# Patient Record
Sex: FEMALE | Race: WHITE | HISPANIC OR LATINO | Employment: OTHER | URBAN - METROPOLITAN AREA
[De-identification: names, ages, dates, MRNs, and addresses within clinical notes are randomized per-mention and may not be internally consistent; named-entity substitution may affect disease eponyms.]

---

## 2017-03-08 ENCOUNTER — HOSPITAL ENCOUNTER (OUTPATIENT)
Dept: LAB | Facility: MEDICAL CENTER | Age: 82
End: 2017-03-08
Payer: MEDICARE

## 2017-03-08 LAB
ALBUMIN SERPL BCP-MCNC: 4.2 G/DL (ref 3.2–4.9)
ALBUMIN/GLOB SERPL: 1.3 G/DL
ALP SERPL-CCNC: 49 U/L (ref 30–99)
ALT SERPL-CCNC: 18 U/L (ref 2–50)
ANION GAP SERPL CALC-SCNC: 9 MMOL/L (ref 0–11.9)
AST SERPL-CCNC: 21 U/L (ref 12–45)
BILIRUB SERPL-MCNC: 1.1 MG/DL (ref 0.1–1.5)
BUN SERPL-MCNC: 21 MG/DL (ref 8–22)
CALCIUM SERPL-MCNC: 9.6 MG/DL (ref 8.5–10.5)
CHLORIDE SERPL-SCNC: 107 MMOL/L (ref 96–112)
CO2 SERPL-SCNC: 22 MMOL/L (ref 20–33)
CREAT SERPL-MCNC: 0.96 MG/DL (ref 0.5–1.4)
GLOBULIN SER CALC-MCNC: 3.3 G/DL (ref 1.9–3.5)
GLUCOSE SERPL-MCNC: 90 MG/DL (ref 65–99)
POTASSIUM SERPL-SCNC: 3.8 MMOL/L (ref 3.6–5.5)
PROT SERPL-MCNC: 7.5 G/DL (ref 6–8.2)
SODIUM SERPL-SCNC: 138 MMOL/L (ref 135–145)
TSH SERPL DL<=0.005 MIU/L-ACNC: 1.62 UIU/ML (ref 0.3–3.7)

## 2017-03-08 PROCEDURE — 80053 COMPREHEN METABOLIC PANEL: CPT

## 2017-03-08 PROCEDURE — 84443 ASSAY THYROID STIM HORMONE: CPT

## 2017-03-08 PROCEDURE — 36415 COLL VENOUS BLD VENIPUNCTURE: CPT

## 2017-12-29 ENCOUNTER — HOSPITAL ENCOUNTER (OUTPATIENT)
Dept: RADIOLOGY | Facility: MEDICAL CENTER | Age: 82
End: 2017-12-29

## 2018-01-08 ENCOUNTER — APPOINTMENT (OUTPATIENT)
Dept: RADIOLOGY | Facility: MEDICAL CENTER | Age: 83
End: 2018-01-08
Attending: FAMILY MEDICINE
Payer: COMMERCIAL

## 2018-01-09 ENCOUNTER — HOSPITAL ENCOUNTER (OUTPATIENT)
Dept: RADIOLOGY | Facility: MEDICAL CENTER | Age: 83
End: 2018-01-09
Attending: FAMILY MEDICINE
Payer: MEDICARE

## 2018-01-09 DIAGNOSIS — Z12.31 VISIT FOR SCREENING MAMMOGRAM: ICD-10-CM

## 2018-01-09 PROCEDURE — 77067 SCR MAMMO BI INCL CAD: CPT

## 2018-01-30 ENCOUNTER — HOSPITAL ENCOUNTER (OUTPATIENT)
Dept: LAB | Facility: MEDICAL CENTER | Age: 83
End: 2018-01-30
Attending: INTERNAL MEDICINE
Payer: MEDICARE

## 2018-01-30 LAB
25(OH)D3 SERPL-MCNC: 14 NG/ML (ref 30–100)
ALBUMIN SERPL BCP-MCNC: 4.7 G/DL (ref 3.2–4.9)
ALBUMIN/GLOB SERPL: 1.8 G/DL
ALP SERPL-CCNC: 44 U/L (ref 30–99)
ALT SERPL-CCNC: 16 U/L (ref 2–50)
ANION GAP SERPL CALC-SCNC: 9 MMOL/L (ref 0–11.9)
AST SERPL-CCNC: 19 U/L (ref 12–45)
BILIRUB SERPL-MCNC: 0.7 MG/DL (ref 0.1–1.5)
BUN SERPL-MCNC: 21 MG/DL (ref 8–22)
CALCIUM SERPL-MCNC: 9.5 MG/DL (ref 8.5–10.5)
CHLORIDE SERPL-SCNC: 105 MMOL/L (ref 96–112)
CO2 SERPL-SCNC: 24 MMOL/L (ref 20–33)
CREAT SERPL-MCNC: 0.82 MG/DL (ref 0.5–1.4)
GLOBULIN SER CALC-MCNC: 2.6 G/DL (ref 1.9–3.5)
GLUCOSE SERPL-MCNC: 76 MG/DL (ref 65–99)
POTASSIUM SERPL-SCNC: 4.4 MMOL/L (ref 3.6–5.5)
PROT SERPL-MCNC: 7.3 G/DL (ref 6–8.2)
PTH-INTACT SERPL-MCNC: 36.9 PG/ML (ref 14–72)
SODIUM SERPL-SCNC: 138 MMOL/L (ref 135–145)

## 2018-01-30 PROCEDURE — 80053 COMPREHEN METABOLIC PANEL: CPT

## 2018-01-30 PROCEDURE — 36415 COLL VENOUS BLD VENIPUNCTURE: CPT

## 2018-01-30 PROCEDURE — 82306 VITAMIN D 25 HYDROXY: CPT

## 2018-01-30 PROCEDURE — 83970 ASSAY OF PARATHORMONE: CPT

## 2018-04-17 ENCOUNTER — HOSPITAL ENCOUNTER (OUTPATIENT)
Dept: LAB | Facility: MEDICAL CENTER | Age: 83
End: 2018-04-17
Attending: INTERNAL MEDICINE
Payer: MEDICARE

## 2018-04-17 LAB — 25(OH)D3 SERPL-MCNC: 40 NG/ML (ref 30–100)

## 2018-04-17 PROCEDURE — 82306 VITAMIN D 25 HYDROXY: CPT

## 2018-04-17 PROCEDURE — 36415 COLL VENOUS BLD VENIPUNCTURE: CPT

## 2018-04-25 ENCOUNTER — HOSPITAL ENCOUNTER (OUTPATIENT)
Dept: LAB | Facility: MEDICAL CENTER | Age: 83
End: 2018-04-25
Attending: OPTOMETRIST
Payer: MEDICARE

## 2018-04-25 LAB — RHEUMATOID FACT SER IA-ACNC: <10 IU/ML (ref 0–14)

## 2018-04-25 PROCEDURE — 86431 RHEUMATOID FACTOR QUANT: CPT

## 2018-04-25 PROCEDURE — 83516 IMMUNOASSAY NONANTIBODY: CPT

## 2018-04-25 PROCEDURE — 86038 ANTINUCLEAR ANTIBODIES: CPT

## 2018-04-25 PROCEDURE — 86235 NUCLEAR ANTIGEN ANTIBODY: CPT

## 2018-04-25 PROCEDURE — 36415 COLL VENOUS BLD VENIPUNCTURE: CPT

## 2018-04-27 LAB — NUCLEAR IGG SER QL IA: NORMAL

## 2018-04-28 LAB
CENTROMERE IGG TITR SER IF: 2 AU/ML (ref 0–40)
ENA JO1 AB TITR SER: 2 AU/ML (ref 0–40)
ENA SCL70 IGG SER QL: 1 AU/ML (ref 0–40)
ENA SM IGG SER-ACNC: 0 AU/ML (ref 0–40)
ENA SS-B IGG SER IA-ACNC: 0 AU/ML (ref 0–40)
ENA SS-B IGG SER IA-ACNC: 0 AU/ML (ref 0–40)
RIBOSOMAL P AB SER-ACNC: 6 AU/ML (ref 0–40)
SSA52 R0ENA AB IGG Q0420: 12 AU/ML (ref 0–40)
SSA52 R0ENA AB IGG Q0420: 12 AU/ML (ref 0–40)
SSA60 R0ENA AB IGG Q0419: 2 AU/ML (ref 0–40)
SSA60 R0ENA AB IGG Q0419: 2 AU/ML (ref 0–40)
U1 SNRNP IGG SER QL: 0 AU/ML (ref 0–40)

## 2019-04-09 ENCOUNTER — HOSPITAL ENCOUNTER (OUTPATIENT)
Dept: LAB | Facility: MEDICAL CENTER | Age: 84
End: 2019-04-09
Attending: INTERNAL MEDICINE
Payer: MEDICARE

## 2019-04-09 LAB
25(OH)D3 SERPL-MCNC: 28 NG/ML (ref 30–100)
ALBUMIN SERPL BCP-MCNC: 4.3 G/DL (ref 3.2–4.9)
CALCIUM SERPL-MCNC: 9.6 MG/DL (ref 8.5–10.5)
CREAT SERPL-MCNC: 1 MG/DL (ref 0.5–1.4)
PTH-INTACT SERPL-MCNC: 47.2 PG/ML (ref 14–72)

## 2019-04-09 PROCEDURE — 82565 ASSAY OF CREATININE: CPT

## 2019-04-09 PROCEDURE — 36415 COLL VENOUS BLD VENIPUNCTURE: CPT

## 2019-04-09 PROCEDURE — 83970 ASSAY OF PARATHORMONE: CPT

## 2019-04-09 PROCEDURE — 82306 VITAMIN D 25 HYDROXY: CPT

## 2019-04-09 PROCEDURE — 82040 ASSAY OF SERUM ALBUMIN: CPT

## 2019-08-12 ENCOUNTER — TELEPHONE (OUTPATIENT)
Dept: HEALTH INFORMATION MANAGEMENT | Facility: OTHER | Age: 84
End: 2019-08-12

## 2019-08-12 NOTE — TELEPHONE ENCOUNTER
1. Caller Name: Briseida Olivares                                           Call Back Number: [phone] (home)       Patient approves a detailed voicemail message: no    2. SPECIFIC Action To Be Taken: Orders pending, please sign.    3. Diagnosis/Clinical Reason for Request: BONE DENSITY    4. Specialty & Provider Name/Lab/Imaging Location: [facility]    5. Is appointment scheduled for requested order/referral: yes - 8/13/2019    Patient was not informed they will receive a return phone call from the office ONLY if there are any questions before processing their request. Advised to call back if they haven't received a call from the referral department in 5 days.

## 2019-08-13 ENCOUNTER — APPOINTMENT (OUTPATIENT)
Dept: RADIOLOGY | Facility: MEDICAL CENTER | Age: 84
End: 2019-08-13
Attending: FAMILY MEDICINE
Payer: MEDICARE

## 2019-08-13 ENCOUNTER — APPOINTMENT (OUTPATIENT)
Dept: RADIOLOGY | Facility: MEDICAL CENTER | Age: 84
End: 2019-08-13
Payer: MEDICARE

## 2019-08-13 DIAGNOSIS — Z12.31 SCREENING MAMMOGRAM, ENCOUNTER FOR: ICD-10-CM

## 2019-08-14 ENCOUNTER — HOSPITAL ENCOUNTER (OUTPATIENT)
Dept: RADIOLOGY | Facility: MEDICAL CENTER | Age: 84
End: 2019-08-14
Attending: FAMILY MEDICINE
Payer: MEDICARE

## 2019-08-14 DIAGNOSIS — N64.4 BREAST PAIN, RIGHT: ICD-10-CM

## 2019-08-14 PROCEDURE — 76642 ULTRASOUND BREAST LIMITED: CPT | Mod: RT

## 2019-08-14 PROCEDURE — G0279 TOMOSYNTHESIS, MAMMO: HCPCS

## 2019-10-09 ENCOUNTER — HOSPITAL ENCOUNTER (OUTPATIENT)
Dept: LAB | Facility: MEDICAL CENTER | Age: 84
End: 2019-10-09
Attending: FAMILY MEDICINE
Payer: MEDICARE

## 2019-10-09 PROCEDURE — 84443 ASSAY THYROID STIM HORMONE: CPT

## 2019-10-09 PROCEDURE — 36415 COLL VENOUS BLD VENIPUNCTURE: CPT

## 2019-10-09 PROCEDURE — 82746 ASSAY OF FOLIC ACID SERUM: CPT

## 2019-10-09 PROCEDURE — 82607 VITAMIN B-12: CPT

## 2019-10-09 PROCEDURE — 86780 TREPONEMA PALLIDUM: CPT

## 2019-10-10 LAB
FOLATE SERPL-MCNC: 20.9 NG/ML
TREPONEMA PALLIDUM IGG+IGM AB [PRESENCE] IN SERUM OR PLASMA BY IMMUNOASSAY: NON REACTIVE
TSH SERPL DL<=0.005 MIU/L-ACNC: 1.88 UIU/ML (ref 0.38–5.33)
VIT B12 SERPL-MCNC: 652 PG/ML (ref 211–911)

## 2019-10-22 ENCOUNTER — HOSPITAL ENCOUNTER (OUTPATIENT)
Dept: RADIOLOGY | Facility: MEDICAL CENTER | Age: 84
End: 2019-10-22
Attending: FAMILY MEDICINE
Payer: MEDICARE

## 2019-10-22 DIAGNOSIS — R92.8 ABNORMAL FINDING ON BREAST IMAGING: ICD-10-CM

## 2019-10-22 DIAGNOSIS — M85.89 OTHER SPECIFIED DISORDERS OF BONE DENSITY AND STRUCTURE, MULTIPLE SITES: ICD-10-CM

## 2019-10-22 LAB — PATHOLOGY CONSULT NOTE: NORMAL

## 2019-10-22 PROCEDURE — 88360 TUMOR IMMUNOHISTOCHEM/MANUAL: CPT

## 2019-10-22 PROCEDURE — 19081 BX BREAST 1ST LESION STRTCTC: CPT

## 2019-10-22 PROCEDURE — 88305 TISSUE EXAM BY PATHOLOGIST: CPT

## 2019-10-22 PROCEDURE — 77080 DXA BONE DENSITY AXIAL: CPT

## 2019-10-28 ENCOUNTER — TELEPHONE (OUTPATIENT)
Dept: RADIOLOGY | Facility: MEDICAL CENTER | Age: 84
End: 2019-10-28

## 2019-11-07 DIAGNOSIS — Z01.812 PRE-OPERATIVE LABORATORY EXAMINATION: ICD-10-CM

## 2019-11-07 DIAGNOSIS — Z01.810 PRE-OPERATIVE CARDIOVASCULAR EXAMINATION: ICD-10-CM

## 2019-11-07 LAB
ALBUMIN SERPL BCP-MCNC: 4.3 G/DL (ref 3.2–4.9)
ALBUMIN/GLOB SERPL: 1.4 G/DL
ALP SERPL-CCNC: 46 U/L (ref 30–99)
ALT SERPL-CCNC: 15 U/L (ref 2–50)
ANION GAP SERPL CALC-SCNC: 9 MMOL/L (ref 0–11.9)
AST SERPL-CCNC: 19 U/L (ref 12–45)
BASOPHILS # BLD AUTO: 0.5 % (ref 0–1.8)
BASOPHILS # BLD: 0.03 K/UL (ref 0–0.12)
BILIRUB SERPL-MCNC: 0.6 MG/DL (ref 0.1–1.5)
BUN SERPL-MCNC: 20 MG/DL (ref 8–22)
CALCIUM SERPL-MCNC: 9.6 MG/DL (ref 8.5–10.5)
CHLORIDE SERPL-SCNC: 106 MMOL/L (ref 96–112)
CO2 SERPL-SCNC: 24 MMOL/L (ref 20–33)
CREAT SERPL-MCNC: 1.06 MG/DL (ref 0.5–1.4)
EKG IMPRESSION: NORMAL
EOSINOPHIL # BLD AUTO: 0.21 K/UL (ref 0–0.51)
EOSINOPHIL NFR BLD: 3.6 % (ref 0–6.9)
ERYTHROCYTE [DISTWIDTH] IN BLOOD BY AUTOMATED COUNT: 45.1 FL (ref 35.9–50)
GLOBULIN SER CALC-MCNC: 3 G/DL (ref 1.9–3.5)
GLUCOSE SERPL-MCNC: 95 MG/DL (ref 65–99)
HCT VFR BLD AUTO: 45.2 % (ref 37–47)
HGB BLD-MCNC: 14.5 G/DL (ref 12–16)
IMM GRANULOCYTES # BLD AUTO: 0.01 K/UL (ref 0–0.11)
IMM GRANULOCYTES NFR BLD AUTO: 0.2 % (ref 0–0.9)
INR PPP: 0.93 (ref 0.87–1.13)
LYMPHOCYTES # BLD AUTO: 1.84 K/UL (ref 1–4.8)
LYMPHOCYTES NFR BLD: 31.6 % (ref 22–41)
MCH RBC QN AUTO: 32.4 PG (ref 27–33)
MCHC RBC AUTO-ENTMCNC: 32.1 G/DL (ref 33.6–35)
MCV RBC AUTO: 100.9 FL (ref 81.4–97.8)
MONOCYTES # BLD AUTO: 0.61 K/UL (ref 0–0.85)
MONOCYTES NFR BLD AUTO: 10.5 % (ref 0–13.4)
NEUTROPHILS # BLD AUTO: 3.12 K/UL (ref 2–7.15)
NEUTROPHILS NFR BLD: 53.6 % (ref 44–72)
NRBC # BLD AUTO: 0 K/UL
NRBC BLD-RTO: 0 /100 WBC
PLATELET # BLD AUTO: 314 K/UL (ref 164–446)
PMV BLD AUTO: 10.1 FL (ref 9–12.9)
POTASSIUM SERPL-SCNC: 4.3 MMOL/L (ref 3.6–5.5)
PROT SERPL-MCNC: 7.3 G/DL (ref 6–8.2)
PROTHROMBIN TIME: 12.6 SEC (ref 12–14.6)
RBC # BLD AUTO: 4.48 M/UL (ref 4.2–5.4)
SODIUM SERPL-SCNC: 139 MMOL/L (ref 135–145)
WBC # BLD AUTO: 5.8 K/UL (ref 4.8–10.8)

## 2019-11-07 PROCEDURE — 36415 COLL VENOUS BLD VENIPUNCTURE: CPT

## 2019-11-07 PROCEDURE — 85610 PROTHROMBIN TIME: CPT

## 2019-11-07 PROCEDURE — 80053 COMPREHEN METABOLIC PANEL: CPT

## 2019-11-07 PROCEDURE — 85025 COMPLETE CBC W/AUTO DIFF WBC: CPT

## 2019-11-07 PROCEDURE — 93010 ELECTROCARDIOGRAM REPORT: CPT | Performed by: INTERNAL MEDICINE

## 2019-11-07 PROCEDURE — 93005 ELECTROCARDIOGRAM TRACING: CPT

## 2019-11-07 RX ORDER — SIMVASTATIN 40 MG
40 TABLET ORAL NIGHTLY
COMMUNITY
End: 2021-11-15

## 2019-11-07 RX ORDER — ANTIOX #8/OM3/DHA/EPA/LUT/ZEAX 250-2.5 MG
CAPSULE ORAL
COMMUNITY
End: 2023-01-01

## 2019-11-07 RX ORDER — LISINOPRIL 20 MG/1
20 TABLET ORAL DAILY
COMMUNITY
End: 2021-11-15

## 2019-11-07 RX ORDER — GLUCOSAMINE/D3/BOSWELLIA SERRA 1500MG-400
TABLET ORAL DAILY
COMMUNITY
End: 2023-01-01

## 2019-11-07 RX ORDER — HYDROCORTISONE ACETATE 0.5 %
CREAM (GRAM) TOPICAL DAILY
COMMUNITY
End: 2023-01-01

## 2019-11-07 RX ORDER — CYCLOSPORINE 0.5 MG/ML
1 EMULSION OPHTHALMIC 2 TIMES DAILY
COMMUNITY

## 2019-11-13 ENCOUNTER — APPOINTMENT (OUTPATIENT)
Dept: RADIOLOGY | Facility: MEDICAL CENTER | Age: 84
End: 2019-11-13
Attending: SURGERY
Payer: MEDICARE

## 2019-11-13 ENCOUNTER — ANESTHESIA (OUTPATIENT)
Dept: SURGERY | Facility: MEDICAL CENTER | Age: 84
End: 2019-11-13
Payer: MEDICARE

## 2019-11-13 ENCOUNTER — HOSPITAL ENCOUNTER (OUTPATIENT)
Facility: MEDICAL CENTER | Age: 84
End: 2019-11-13
Attending: SURGERY | Admitting: SURGERY
Payer: MEDICARE

## 2019-11-13 ENCOUNTER — ANESTHESIA EVENT (OUTPATIENT)
Dept: SURGERY | Facility: MEDICAL CENTER | Age: 84
End: 2019-11-13
Payer: MEDICARE

## 2019-11-13 VITALS
OXYGEN SATURATION: 95 % | TEMPERATURE: 97.7 F | BODY MASS INDEX: 24.89 KG/M2 | HEART RATE: 67 BPM | HEIGHT: 61 IN | SYSTOLIC BLOOD PRESSURE: 134 MMHG | WEIGHT: 131.84 LBS | DIASTOLIC BLOOD PRESSURE: 67 MMHG | RESPIRATION RATE: 16 BRPM

## 2019-11-13 DIAGNOSIS — D05.11 INTRADUCTAL CARCINOMA IN SITU OF RIGHT BREAST: ICD-10-CM

## 2019-11-13 LAB — PATHOLOGY CONSULT NOTE: NORMAL

## 2019-11-13 PROCEDURE — 700101 HCHG RX REV CODE 250: Performed by: SURGERY

## 2019-11-13 PROCEDURE — 160039 HCHG SURGERY MINUTES - EA ADDL 1 MIN LEVEL 3: Performed by: SURGERY

## 2019-11-13 PROCEDURE — A6402 STERILE GAUZE <= 16 SQ IN: HCPCS | Performed by: SURGERY

## 2019-11-13 PROCEDURE — 160028 HCHG SURGERY MINUTES - 1ST 30 MINS LEVEL 3: Performed by: SURGERY

## 2019-11-13 PROCEDURE — 160035 HCHG PACU - 1ST 60 MINS PHASE I: Performed by: SURGERY

## 2019-11-13 PROCEDURE — 700101 HCHG RX REV CODE 250: Performed by: ANESTHESIOLOGY

## 2019-11-13 PROCEDURE — 501838 HCHG SUTURE GENERAL: Performed by: SURGERY

## 2019-11-13 PROCEDURE — 160025 RECOVERY II MINUTES (STATS): Performed by: SURGERY

## 2019-11-13 PROCEDURE — 500423 HCHG DRESSING, ABD COMBINE: Performed by: SURGERY

## 2019-11-13 PROCEDURE — 160002 HCHG RECOVERY MINUTES (STAT): Performed by: SURGERY

## 2019-11-13 PROCEDURE — 160046 HCHG PACU - 1ST 60 MINS PHASE II: Performed by: SURGERY

## 2019-11-13 PROCEDURE — 700105 HCHG RX REV CODE 258: Performed by: SURGERY

## 2019-11-13 PROCEDURE — 160009 HCHG ANES TIME/MIN: Performed by: SURGERY

## 2019-11-13 PROCEDURE — 19281 PERQ DEVICE BREAST 1ST IMAG: CPT | Mod: RT

## 2019-11-13 PROCEDURE — 700111 HCHG RX REV CODE 636 W/ 250 OVERRIDE (IP): Performed by: ANESTHESIOLOGY

## 2019-11-13 PROCEDURE — 76098 X-RAY EXAM SURGICAL SPECIMEN: CPT | Mod: RT

## 2019-11-13 PROCEDURE — 500054 HCHG BANDAGE, ELASTIC 6: Performed by: SURGERY

## 2019-11-13 PROCEDURE — 160048 HCHG OR STATISTICAL LEVEL 1-5: Performed by: SURGERY

## 2019-11-13 PROCEDURE — 88307 TISSUE EXAM BY PATHOLOGIST: CPT | Mod: 59

## 2019-11-13 RX ORDER — MIDAZOLAM HYDROCHLORIDE 1 MG/ML
INJECTION INTRAMUSCULAR; INTRAVENOUS PRN
Status: DISCONTINUED | OUTPATIENT
Start: 2019-11-13 | End: 2019-11-13 | Stop reason: SURG

## 2019-11-13 RX ORDER — KETOROLAC TROMETHAMINE 30 MG/ML
INJECTION, SOLUTION INTRAMUSCULAR; INTRAVENOUS PRN
Status: DISCONTINUED | OUTPATIENT
Start: 2019-11-13 | End: 2019-11-13 | Stop reason: SURG

## 2019-11-13 RX ORDER — MEPERIDINE HYDROCHLORIDE 25 MG/ML
6.25 INJECTION INTRAMUSCULAR; INTRAVENOUS; SUBCUTANEOUS
Status: DISCONTINUED | OUTPATIENT
Start: 2019-11-13 | End: 2019-11-13 | Stop reason: HOSPADM

## 2019-11-13 RX ORDER — BUPIVACAINE HYDROCHLORIDE AND EPINEPHRINE 5; 5 MG/ML; UG/ML
INJECTION, SOLUTION EPIDURAL; INTRACAUDAL; PERINEURAL
Status: DISCONTINUED
Start: 2019-11-13 | End: 2019-11-13 | Stop reason: HOSPADM

## 2019-11-13 RX ORDER — LIDOCAINE HYDROCHLORIDE 20 MG/ML
INJECTION, SOLUTION EPIDURAL; INFILTRATION; INTRACAUDAL; PERINEURAL PRN
Status: DISCONTINUED | OUTPATIENT
Start: 2019-11-13 | End: 2019-11-13 | Stop reason: SURG

## 2019-11-13 RX ORDER — ONDANSETRON 2 MG/ML
4 INJECTION INTRAMUSCULAR; INTRAVENOUS
Status: DISCONTINUED | OUTPATIENT
Start: 2019-11-13 | End: 2019-11-13 | Stop reason: HOSPADM

## 2019-11-13 RX ORDER — SODIUM CHLORIDE, SODIUM LACTATE, POTASSIUM CHLORIDE, CALCIUM CHLORIDE 600; 310; 30; 20 MG/100ML; MG/100ML; MG/100ML; MG/100ML
INJECTION, SOLUTION INTRAVENOUS CONTINUOUS
Status: DISCONTINUED | OUTPATIENT
Start: 2019-11-13 | End: 2019-11-13 | Stop reason: HOSPADM

## 2019-11-13 RX ORDER — ONDANSETRON 2 MG/ML
INJECTION INTRAMUSCULAR; INTRAVENOUS PRN
Status: DISCONTINUED | OUTPATIENT
Start: 2019-11-13 | End: 2019-11-13 | Stop reason: SURG

## 2019-11-13 RX ORDER — BUPIVACAINE HYDROCHLORIDE AND EPINEPHRINE 5; 5 MG/ML; UG/ML
INJECTION, SOLUTION EPIDURAL; INTRACAUDAL; PERINEURAL
Status: DISCONTINUED | OUTPATIENT
Start: 2019-11-13 | End: 2019-11-13 | Stop reason: HOSPADM

## 2019-11-13 RX ORDER — OXYCODONE HCL 5 MG/5 ML
5 SOLUTION, ORAL ORAL
Status: DISCONTINUED | OUTPATIENT
Start: 2019-11-13 | End: 2019-11-13 | Stop reason: HOSPADM

## 2019-11-13 RX ORDER — OXYCODONE HCL 5 MG/5 ML
10 SOLUTION, ORAL ORAL
Status: DISCONTINUED | OUTPATIENT
Start: 2019-11-13 | End: 2019-11-13 | Stop reason: HOSPADM

## 2019-11-13 RX ORDER — CELECOXIB 200 MG/1
200 CAPSULE ORAL ONCE
Status: DISCONTINUED | OUTPATIENT
Start: 2019-11-13 | End: 2019-11-13 | Stop reason: HOSPADM

## 2019-11-13 RX ORDER — CEFAZOLIN SODIUM 1 G/3ML
INJECTION, POWDER, FOR SOLUTION INTRAMUSCULAR; INTRAVENOUS PRN
Status: DISCONTINUED | OUTPATIENT
Start: 2019-11-13 | End: 2019-11-13 | Stop reason: SURG

## 2019-11-13 RX ORDER — DIPHENHYDRAMINE HYDROCHLORIDE 50 MG/ML
12.5 INJECTION INTRAMUSCULAR; INTRAVENOUS
Status: DISCONTINUED | OUTPATIENT
Start: 2019-11-13 | End: 2019-11-13 | Stop reason: HOSPADM

## 2019-11-13 RX ADMIN — KETOROLAC TROMETHAMINE 15 MG: 30 INJECTION, SOLUTION INTRAMUSCULAR at 12:16

## 2019-11-13 RX ADMIN — SODIUM CHLORIDE, POTASSIUM CHLORIDE, SODIUM LACTATE AND CALCIUM CHLORIDE: 600; 310; 30; 20 INJECTION, SOLUTION INTRAVENOUS at 11:59

## 2019-11-13 RX ADMIN — CEFAZOLIN 2 G: 330 INJECTION, POWDER, FOR SOLUTION INTRAMUSCULAR; INTRAVENOUS at 11:56

## 2019-11-13 RX ADMIN — SODIUM CHLORIDE, POTASSIUM CHLORIDE, SODIUM LACTATE AND CALCIUM CHLORIDE 1000 ML: 600; 310; 30; 20 INJECTION, SOLUTION INTRAVENOUS at 07:49

## 2019-11-13 RX ADMIN — ONDANSETRON 4 MG: 2 INJECTION INTRAMUSCULAR; INTRAVENOUS at 12:20

## 2019-11-13 RX ADMIN — MIDAZOLAM 0.5 MG: 1 INJECTION INTRAMUSCULAR; INTRAVENOUS at 12:11

## 2019-11-13 RX ADMIN — LIDOCAINE HYDROCHLORIDE 40 MG: 20 INJECTION, SOLUTION EPIDURAL; INFILTRATION; INTRACAUDAL at 12:11

## 2019-11-13 RX ADMIN — MIDAZOLAM 1.5 MG: 1 INJECTION INTRAMUSCULAR; INTRAVENOUS at 12:01

## 2019-11-13 RX ADMIN — PROPOFOL 130 MG: 10 INJECTION, EMULSION INTRAVENOUS at 12:11

## 2019-11-13 ASSESSMENT — PAIN SCALES - GENERAL: PAIN_LEVEL: 3

## 2019-11-13 NOTE — ANESTHESIA POSTPROCEDURE EVALUATION
Patient: Briseida Olivares    Procedure Summary     Date:  11/13/19 Room / Location:  [facility] ROOM 23 / SURGERY SAME DAY [facility] ORS    Anesthesia Start:  1159 Anesthesia Stop:  1304    Procedure:  LUMPECTOMY, BREAST- WIRE LOCALIZED (Right Breast) Diagnosis:  (RIGHT BREAST DCIS)    Surgeon:  Oleg Nguyen M.D. Responsible Provider:  Teofilo Willingham M.D.    Anesthesia Type:  general ASA Status:  2          Final Anesthesia Type: general  Last vitals  BP   Blood Pressure : 137/58    Temp   36.5 °C (97.7 °F)    Pulse   Pulse: 77   Resp   16    SpO2   96 %      Anesthesia Post Evaluation    Patient location during evaluation: PACU  Patient participation: complete - patient participated  Level of consciousness: awake and alert  Pain score: 3    Airway patency: patent  Anesthetic complications: no  Cardiovascular status: hemodynamically stable  Respiratory status: acceptable  Hydration status: euvolemic    PONV: none           Nurse Pain Score: 2 (NPRS)

## 2019-11-13 NOTE — DISCHARGE INSTRUCTIONS
ACTIVITY: Rest and take it easy for the first 24 hours.  A responsible adult is recommended to remain with you during that time.  It is normal to feel sleepy.  We encourage you to not do anything that requires balance, judgment or coordination.    MILD FLU-LIKE SYMPTOMS ARE NORMAL. YOU MAY EXPERIENCE GENERALIZED MUSCLE ACHES, THROAT IRRITATION, HEADACHE AND/OR SOME NAUSEA.    FOR 24 HOURS DO NOT:  Drive, operate machinery or run household appliances.  Drink beer or alcoholic beverages.   Make important decisions or sign legal documents.    SPECIAL INSTRUCTIONS: *Remove ACE wrap in 24 hours; may loosen wrap if too tight   Remove plastic and gauze in 3 days   Leave underlying steristips on until they fall off   Patient may shower on Post OP Day #2**    DIET: To avoid nausea, slowly advance diet as tolerated, avoiding spicy or greasy foods for the first day.  Add more substantial food to your diet according to your physician's instructions.  Babies can be fed formula or breast milk as soon as they are hungry.  INCREASE FLUIDS AND FIBER TO AVOID CONSTIPATION.    SURGICAL DRESSING/BATHING: *May shower in 48 hours, 11/15 at 2 PM or after**    FOLLOW-UP APPOINTMENT:  A follow-up appointment should be arranged with your doctor in *keep as scheduled or call to schedule**; call to schedule.    You should CALL YOUR PHYSICIAN if you develop:  Fever greater than 101 degrees F.  Pain not relieved by medication, or persistent nausea or vomiting.  Excessive bleeding (blood soaking through dressing) or unexpected drainage from the wound.  Extreme redness or swelling around the incision site, drainage of pus or foul smelling drainage.  Inability to urinate or empty your bladder within 8 hours.  Problems with breathing or chest pain.    You should call 911 if you develop problems with breathing or chest pain.  If you are unable to contact your doctor or surgical center, you should go to the nearest emergency room or urgent care  center.  Physician's telephone #: * [phone]**    If any questions arise, call your doctor.  If your doctor is not available, please feel free to call the Surgical Center at [phone].  The Center is open Monday through Friday from 7AM to 7PM.  You can also call the HEALTH HOTLINE open 24 hours/day, 7 days/week and speak to a nurse at [phone], or toll free at [phone].    A registered nurse may call you a few days after your surgery to see how you are doing after your procedure.    MEDICATIONS: Resume taking daily medication.  Take prescribed pain medication with food.  If no medication is prescribed, you may take non-aspirin pain medication if needed.  PAIN MEDICATION CAN BE VERY CONSTIPATING.  Take a stool softener or laxative such as senokot, pericolace, or milk of magnesia if needed.    Prescription given for *none**.  Last pain medication given at **take over the counter medication*.    If your physician has prescribed pain medication that includes Acetaminophen (Tylenol), do not take additional Acetaminophen (Tylenol) while taking the prescribed medication.    Depression / Suicide Risk    As you are discharged from this [facility] facility, it is important to learn how to keep safe from harming yourself.    Recognize the warning signs:  · Abrupt changes in personality, positive or negative- including increase in energy   · Giving away possessions  · Change in eating patterns- significant weight changes-  positive or negative  · Change in sleeping patterns- unable to sleep or sleeping all the time   · Unwillingness or inability to communicate  · Depression  · Unusual sadness, discouragement and loneliness  · Talk of wanting to die  · Neglect of personal appearance   · Rebelliousness- reckless behavior  · Withdrawal from people/activities they love  · Confusion- inability to concentrate     If you or a loved one observes any of these behaviors or has concerns about self-harm, here's  what you can do:  · Talk about it- your feelings and reasons for harming yourself  · Remove any means that you might use to hurt yourself (examples: pills, rope, extension cords, firearm)  · Get professional help from the community (Mental Health, Substance Abuse, psychological counseling)  · Do not be alone:Call your Safe Contact- someone whom you trust who will be there for you.  · Call your local CRISIS HOTLINE 523-1584 or 497-900-8076  · Call your local Children's Mobile Crisis Response Team [place] (889) 484-1187 or www.[employer]  · Call the toll free National Suicide Prevention Hotlines   · National Suicide Prevention Lifeline 693-104-JUPZ (8553)  · National Hope Line Network 800-SUICIDE (710-9385)

## 2019-11-13 NOTE — ANESTHESIA PREPROCEDURE EVALUATION
Relevant Problems   No relevant active problems       Physical Exam    Airway   Mallampati: II  TM distance: >3 FB  Neck ROM: full       Cardiovascular - normal exam  Rhythm: regular  Rate: normal  (-) murmur     Dental - normal exam         Pulmonary - normal exam  Breath sounds clear to auscultation     Abdominal    Neurological - normal exam                 Anesthesia Plan        Plan - general       Airway plan will be LMA  (Wants no narcotics intra-op.)      Induction: intravenous    Postoperative Plan: Postoperative administration of opioids is intended.    Pertinent diagnostic labs and testing reviewed    Informed Consent:    Anesthetic plan and risks discussed with patient.    Use of blood products discussed with: patient whom consented to blood products.

## 2019-11-13 NOTE — ANESTHESIA QCDR
2019 Qualified Clinical Data Registry (for Quality Improvement)     Postoperative nausea/vomiting risk protocol (Adult = 18 yrs and Pediatric 3-17 yrs)- (430 and 463)  General inhalation anesthetic (NOT TIVA) with PONV risk factors: Yes  Provision of anti-emetic therapy with at least 2 different classes of agents: No   Patient DID NOT receive anti-emetic therapy and reason is documented in Medical Record:        Multimodal Pain Management- (AQI59)  Patient undergoing Elective Surgery (i.e. Outpatient, or ASC, or Prescheduled Surgery prior to Hospital Admission): Yes  Use of Multimodal Pain Management, two or more drugs and/or interventions, NOT including systemic opioids: Yes   Exception: Documented allergy to multiple classes of analgesics:  N/A    PACU assessment of acute postoperative pain prior to Anesthesia Care End- Applies to Patients Age = 18- (ABG7)  Initial PACU pain score is which of the following: < 7/10  Patient unable to report pain score: N/A    Post-anesthetic transfer of care checklist/protocol to PACU/ICU- (426 and 427)  Upon conclusion of case, patient transferred to which of the following locations: PACU/Non-ICU  Use of transfer checklist/protocol: Yes  Exclusion: Service Performed in Patient Hospital Room (and thus did not require transfer): N/A    PACU Reintubation- (AQI31)  General anesthesia requiring endotracheal intubation (ETT) along with subsequent extubation in OR or PACU: No  Required reintubation in the PACU: N/A  Extubation was a planned trial documented in the medical record prior to removal of the original airway device: N/A    Unplanned admission to ICU related to anesthesia service up through end of PACU care- (MD51)  Unplanned admission to ICU (not initially anticipated at anesthesia start time): No

## 2019-11-13 NOTE — ANESTHESIA TIME REPORT
Anesthesia Start and Stop Event Times     Date Time Event    11/13/2019 1117 Ready for Procedure     1159 Anesthesia Start     1304 Anesthesia Stop        Responsible Staff  11/13/19    Name Role Begin End    Teofilo Willingham M.D. Anesth 1159 1304        Preop Diagnosis (Free Text):  Pre-op Diagnosis     RIGHT BREAST DCIS        Preop Diagnosis (Codes):    Post op Diagnosis  DCIS (ductal carcinoma in situ) of breast      Premium Reason  Non-Premium    Comments:

## 2019-11-13 NOTE — OR SURGEON
Immediate Post OP Note    PreOp Diagnosis: Right Breast DCIS    PostOp Diagnosis: same    Procedure(s):  RIGHT LUMPECTOMY, BREAST- WIRE LOCALIZED - Wound Class: Clean    Surgeon(s):  Oleg Nguyen M.D.  Assistant:  Ge Forte MS III    Anesthesiologist/Type of Anesthesia:  Anesthesiologist: Teofilo Willingham M.D./General    Surgical Staff:  Circulator: Adrian Shipman R.N.  Scrub Person: Jarvis Red    Specimens removed if any:  ID Type Source Tests Collected by Time Destination   A : Right breast lumpectomy Tissue Breast PATHOLOGY SPECIMEN Oleg Nguyen M.D. 11/13/2019  7:39 AM    B : Additional tissue superior margin Tissue Breast PATHOLOGY SPECIMEN Oleg Nguyen M.D. 11/13/2019 12:10 PM    C : Additional tissue inferior margin Tissue Breast PATHOLOGY SPECIMEN Oleg Nguyen M.D. 11/13/2019 12:11 PM    D : Additional tissue medial margin Tissue Breast PATHOLOGY SPECIMEN Oleg Nguyen M.D. 11/13/2019 12:11 PM    E : Additional tissue lateral margin Tissue Breast PATHOLOGY SPECIMEN Oleg Nguyen M.D. 11/13/2019 12:11 PM    F : Additional tissue deep margin Tissue Breast PATHOLOGY SPECIMEN Oleg Nguyen M.D. 11/13/2019 12:11 PM        Estimated Blood Loss: minimal    Findings: previous biopsy site located, specimen radiograph revealed the clip and entire wire, no gross pathology noted    Complications: no apparent complications        11/13/2019 1:16 PM Oleg Nguyen M.D.

## 2019-11-13 NOTE — ANESTHESIA PREPROCEDURE EVALUATION
Relevant Problems   No relevant active problems       Physical Exam    Anesthesia Plan    ASA 2       Plan - general             Induction: intravenous    Postoperative Plan: Postoperative administration of opioids is intended.    Pertinent diagnostic labs and testing reviewed    Informed Consent:    Anesthetic plan and risks discussed with patient.    Use of blood products discussed with: patient whom consented to blood products.

## 2019-11-13 NOTE — ANESTHESIA PROCEDURE NOTES
Airway  Date/Time: 11/13/2019 12:04 PM  Performed by: Teofilo Willingham M.D.  Authorized by: Teofilo Willingham M.D.     Location:  OR  Urgency:  Elective  Indications for Airway Management:  Anesthesia  Spontaneous Ventilation: absent    Sedation Level:  Deep  Preoxygenated: Yes    Patient Position:  Sniffing  Final Airway Type:  Supraglottic airway  Final Supraglottic Airway:  Standard LMA  SGA Size:  3  Number of Attempts at Approach:  1

## 2019-11-14 ENCOUNTER — PATIENT OUTREACH (OUTPATIENT)
Dept: OTHER | Facility: MEDICAL CENTER | Age: 84
End: 2019-11-14

## 2019-11-14 NOTE — PROGRESS NOTES
Shannan reached out to patient to follow up on introduction letter and check on her recovery from surgery.  Left a voicemail and my contact information.

## 2019-11-15 NOTE — OP REPORT
DATE OF SERVICE:  11/13/2019    SURGEON PRESENT:  Oleg Nguyen MD    ANESTHESIOLOGIST:  ____.    TYPE OF ANESTHESIA:  General anesthesia using laryngeal mask airway plus   local.    PREOPERATIVE DIAGNOSIS:  Right breast ductal carcinoma in situ.    POSTOPERATIVE DIAGNOSIS:  Right breast ductal carcinoma in situ, clinical   stage 0.    PROCEDURE:  Wire localized right breast lumpectomy.    FINDINGS:  The patient is an 83-year-old female referred from Dr. Lillie Enirque and Dr. Sathish Pandya for a recent abnormal mammogram.  A biopsy of   clustered calcifications in the right posterior breast revealed high-grade   ductal carcinoma in situ.  Hormone receptors were negative.  Options were   discussed with the patient and she elected to proceed with a right breast   lumpectomy to be followed by radiation therapy.  The patient presented today   for a wire localized right breast biopsy, which was performed without apparent   complications.  Specimen radiograph revealed the previous clips from the   biopsy to be within the center of the specimen.    FINDINGS AND PROCEDURE:  The patient was brought to the operating room and   placed on table in supine position.  General anesthetic was provided by the   anesthesiologist.  The right breast was prepped and draped in the usual   sterile fashion being careful to not dislodge the wire that had been placed by   radiology.  A time-out was called.  The correct patient, correct diagnosis,   correct surgery, and correct location of the surgery were discussed and agreed   upon.  She did receive preoperative IV antibiotics.  The wire was entering   the superior aspect of the right breast.  An elliptical incision was made   directly over the area where the tip of the wire would be located.  This was   done with #15 blade and all bleeding was controlled with electrocautery.    Dissection was then carried down into the breast parenchyma.  The wire was   then brought into the operative  field.  A lumpectomy around the wire was then   performed trying to obtain at least a cm margin around this wire as it entered   the breast tissue in an inferior and posterior direction.  Entire specimen   was removed with the wire in the center of it.  A specimen radiograph was   obtained, which revealed the clip and the entire wire to be in the center of   the specimen.  There did not appear to be any residual calcifications or   masses.  The specimen was then oriented and then sent to pathology for further   characterization.  Additional margins were then taken from the lumpectomy   site.  Additional superior, inferior, medial, lateral, and deep margins were   taken.  Each of these were about 2x2x1 cm in size and the new margins were   marked with sutures.  The wound was irrigated with approximately 100 mL of   warm normal saline.  Bleeding points were controlled with electrocautery.  The   deep breast tissue was then mobilized off the pectoralis major muscle and   away from the overlying skin.  The breast tissue was then brought back   together loosely with 3-0 Vicryl suture.  The dermis of the incision was then   closed using running 3-0 Vicryl suture.  The skin was closed using running 4-0   Monocryl suture in subcuticular fashion.  Steri-Strips and sterile dressing   were applied.  An Ace wrap was applied.  The patient was transferred to   recovery room for further postoperative care.       ____________________________________     MD ZECHARIAH MYERS / NTS    DD:  11/14/2019 19:47:19  DT:  11/14/2019 21:41:06    D#:  [id number]  Job#:  [id number]    cc: MADELEINE SCHMITZ MD, HOLLY MCDONALD MD

## 2019-12-04 ENCOUNTER — PATIENT OUTREACH (OUTPATIENT)
Dept: OTHER | Facility: MEDICAL CENTER | Age: 84
End: 2019-12-04

## 2019-12-04 NOTE — PROGRESS NOTES
DILSHAD contacted patient to follow up on introduction letter and post surgery.  Patient reports that she is doing fine and healing well.  She states that her steri strips are still on.  She also states that she is visiting her family in [place] and will remain there until early of next year.  She states that she did receive her follow up pathology and she reports that she does not need any further treatment.  Patient reports receiving my letter and has my contact information for any further needs that should arise in the future if her circumstances should change.

## 2020-09-15 ENCOUNTER — HOSPITAL ENCOUNTER (OUTPATIENT)
Dept: LAB | Facility: MEDICAL CENTER | Age: 85
End: 2020-09-15
Attending: FAMILY MEDICINE
Payer: MEDICARE

## 2020-09-15 LAB
25(OH)D3 SERPL-MCNC: 31 NG/ML (ref 30–100)
ALBUMIN SERPL BCP-MCNC: 4.4 G/DL (ref 3.2–4.9)
ALBUMIN/GLOB SERPL: 1.6 G/DL
ALP SERPL-CCNC: 59 U/L (ref 30–99)
ALT SERPL-CCNC: 14 U/L (ref 2–50)
ANION GAP SERPL CALC-SCNC: 14 MMOL/L (ref 7–16)
AST SERPL-CCNC: 17 U/L (ref 12–45)
BILIRUB SERPL-MCNC: 0.6 MG/DL (ref 0.1–1.5)
BUN SERPL-MCNC: 22 MG/DL (ref 8–22)
CALCIUM SERPL-MCNC: 9 MG/DL (ref 8.5–10.5)
CHLORIDE SERPL-SCNC: 104 MMOL/L (ref 96–112)
CHOLEST SERPL-MCNC: 228 MG/DL (ref 100–199)
CO2 SERPL-SCNC: 21 MMOL/L (ref 20–33)
CREAT SERPL-MCNC: 0.92 MG/DL (ref 0.5–1.4)
FASTING STATUS PATIENT QL REPORTED: NORMAL
GLOBULIN SER CALC-MCNC: 2.7 G/DL (ref 1.9–3.5)
GLUCOSE SERPL-MCNC: 127 MG/DL (ref 65–99)
HDLC SERPL-MCNC: 45 MG/DL
LDLC SERPL CALC-MCNC: 131 MG/DL
POTASSIUM SERPL-SCNC: 4.2 MMOL/L (ref 3.6–5.5)
PROT SERPL-MCNC: 7.1 G/DL (ref 6–8.2)
SODIUM SERPL-SCNC: 139 MMOL/L (ref 135–145)
TRIGL SERPL-MCNC: 259 MG/DL (ref 0–149)

## 2020-09-15 PROCEDURE — 80053 COMPREHEN METABOLIC PANEL: CPT

## 2020-09-15 PROCEDURE — 80061 LIPID PANEL: CPT

## 2020-09-15 PROCEDURE — 82306 VITAMIN D 25 HYDROXY: CPT

## 2020-09-15 PROCEDURE — 36415 COLL VENOUS BLD VENIPUNCTURE: CPT

## 2020-11-17 ENCOUNTER — HOSPITAL ENCOUNTER (OUTPATIENT)
Dept: LAB | Facility: MEDICAL CENTER | Age: 85
End: 2020-11-17
Attending: FAMILY MEDICINE
Payer: MEDICARE

## 2020-11-17 LAB
CHOLEST SERPL-MCNC: 153 MG/DL (ref 100–199)
FASTING STATUS PATIENT QL REPORTED: NORMAL
HDLC SERPL-MCNC: 52 MG/DL
LDLC SERPL CALC-MCNC: 68 MG/DL
TRIGL SERPL-MCNC: 165 MG/DL (ref 0–149)

## 2020-11-17 PROCEDURE — 36415 COLL VENOUS BLD VENIPUNCTURE: CPT

## 2020-11-17 PROCEDURE — 80061 LIPID PANEL: CPT

## 2021-01-11 DIAGNOSIS — Z23 NEED FOR VACCINATION: ICD-10-CM

## 2021-02-10 ENCOUNTER — IMMUNIZATION (OUTPATIENT)
Dept: FAMILY PLANNING/WOMEN'S HEALTH CLINIC | Facility: IMMUNIZATION CENTER | Age: 86
End: 2021-02-10
Attending: INTERNAL MEDICINE
Payer: MEDICARE

## 2021-02-10 DIAGNOSIS — Z23 ENCOUNTER FOR VACCINATION: Primary | ICD-10-CM

## 2021-02-10 DIAGNOSIS — Z23 NEED FOR VACCINATION: ICD-10-CM

## 2021-02-10 PROCEDURE — 0001A PFIZER SARS-COV-2 VACCINE: CPT

## 2021-02-10 PROCEDURE — 91300 PFIZER SARS-COV-2 VACCINE: CPT

## 2021-03-03 ENCOUNTER — IMMUNIZATION (OUTPATIENT)
Dept: FAMILY PLANNING/WOMEN'S HEALTH CLINIC | Facility: IMMUNIZATION CENTER | Age: 86
End: 2021-03-03
Attending: INTERNAL MEDICINE
Payer: MEDICARE

## 2021-03-03 DIAGNOSIS — Z23 ENCOUNTER FOR VACCINATION: Primary | ICD-10-CM

## 2021-03-03 PROCEDURE — 0002A PFIZER SARS-COV-2 VACCINE: CPT | Performed by: INTERNAL MEDICINE

## 2021-03-03 PROCEDURE — 91300 PFIZER SARS-COV-2 VACCINE: CPT | Performed by: INTERNAL MEDICINE

## 2021-06-09 ENCOUNTER — HOSPITAL ENCOUNTER (OUTPATIENT)
Dept: RADIOLOGY | Facility: MEDICAL CENTER | Age: 86
End: 2021-06-09
Attending: FAMILY MEDICINE
Payer: MEDICARE

## 2021-06-09 DIAGNOSIS — D05.11 DUCTAL CARCINOMA IN SITU OF RIGHT BREAST: ICD-10-CM

## 2021-06-09 PROCEDURE — G0279 TOMOSYNTHESIS, MAMMO: HCPCS

## 2021-06-10 ENCOUNTER — HOSPITAL ENCOUNTER (OUTPATIENT)
Dept: LAB | Facility: MEDICAL CENTER | Age: 86
End: 2021-06-10
Attending: FAMILY MEDICINE
Payer: MEDICARE

## 2021-06-10 LAB
ANION GAP SERPL CALC-SCNC: 13 MMOL/L (ref 7–16)
BUN SERPL-MCNC: 19 MG/DL (ref 8–22)
CALCIUM SERPL-MCNC: 9.1 MG/DL (ref 8.5–10.5)
CHLORIDE SERPL-SCNC: 107 MMOL/L (ref 96–112)
CO2 SERPL-SCNC: 20 MMOL/L (ref 20–33)
CREAT SERPL-MCNC: 0.89 MG/DL (ref 0.5–1.4)
GLUCOSE SERPL-MCNC: 97 MG/DL (ref 65–99)
POTASSIUM SERPL-SCNC: 4.1 MMOL/L (ref 3.6–5.5)
SODIUM SERPL-SCNC: 140 MMOL/L (ref 135–145)

## 2021-06-10 PROCEDURE — 36415 COLL VENOUS BLD VENIPUNCTURE: CPT

## 2021-06-10 PROCEDURE — 80048 BASIC METABOLIC PNL TOTAL CA: CPT

## 2021-07-01 ENCOUNTER — PATIENT MESSAGE (OUTPATIENT)
Dept: HEALTH INFORMATION MANAGEMENT | Facility: OTHER | Age: 86
End: 2021-07-01

## 2021-11-08 ENCOUNTER — TELEPHONE (OUTPATIENT)
Dept: HEALTH INFORMATION MANAGEMENT | Facility: OTHER | Age: 86
End: 2021-11-08

## 2021-11-08 NOTE — TELEPHONE ENCOUNTER
Comprehensive Health Assessment. Scheduled on 11/15/21 at 1pm with Dr. Chin. Verified HIPAA. Member did not receive any notification. Scheduled a Uber ride for .    Attempt #2

## 2021-11-15 PROBLEM — I10 HYPERTENSION: Status: ACTIVE | Noted: 2021-11-15

## 2021-11-15 PROBLEM — H04.129 DRY EYE SYNDROME: Status: ACTIVE | Noted: 2021-11-15

## 2021-11-15 PROBLEM — G62.9 PERIPHERAL NEUROPATHY: Status: ACTIVE | Noted: 2021-11-15

## 2021-11-15 PROBLEM — E78.5 DYSLIPIDEMIA: Status: ACTIVE | Noted: 2021-11-15

## 2021-11-15 PROBLEM — R73.9 HYPERGLYCEMIA: Status: ACTIVE | Noted: 2021-11-15

## 2021-11-15 PROBLEM — M81.0 AGE-RELATED OSTEOPOROSIS WITHOUT CURRENT PATHOLOGICAL FRACTURE: Status: ACTIVE | Noted: 2021-11-15

## 2022-01-01 ENCOUNTER — OFFICE VISIT (OUTPATIENT)
Dept: MEDICAL GROUP | Facility: CLINIC | Age: 87
End: 2022-01-01
Payer: MEDICARE

## 2022-01-01 ENCOUNTER — APPOINTMENT (OUTPATIENT)
Dept: PHYSICAL THERAPY | Facility: REHABILITATION | Age: 87
End: 2022-01-01
Attending: FAMILY MEDICINE
Payer: MEDICARE

## 2022-01-01 ENCOUNTER — PATIENT MESSAGE (OUTPATIENT)
Dept: HEALTH INFORMATION MANAGEMENT | Facility: OTHER | Age: 87
End: 2022-01-01

## 2022-01-01 ENCOUNTER — DOCUMENTATION (OUTPATIENT)
Dept: HEALTH INFORMATION MANAGEMENT | Facility: OTHER | Age: 87
End: 2022-01-01
Payer: MEDICARE

## 2022-01-01 ENCOUNTER — TELEPHONE (OUTPATIENT)
Dept: HEALTH INFORMATION MANAGEMENT | Facility: OTHER | Age: 87
End: 2022-01-01
Payer: MEDICARE

## 2022-01-01 ENCOUNTER — HOSPITAL ENCOUNTER (OUTPATIENT)
Dept: LAB | Facility: MEDICAL CENTER | Age: 87
End: 2022-09-12
Attending: FAMILY MEDICINE
Payer: MEDICARE

## 2022-01-01 ENCOUNTER — RESEARCH ENCOUNTER (OUTPATIENT)
Dept: MEDICAL GROUP | Facility: PHYSICIAN GROUP | Age: 87
End: 2022-01-01
Payer: MEDICARE

## 2022-01-01 VITALS
HEIGHT: 62 IN | HEART RATE: 96 BPM | TEMPERATURE: 97 F | BODY MASS INDEX: 23.62 KG/M2 | OXYGEN SATURATION: 94 % | SYSTOLIC BLOOD PRESSURE: 119 MMHG | WEIGHT: 128.38 LBS | DIASTOLIC BLOOD PRESSURE: 76 MMHG | RESPIRATION RATE: 16 BRPM

## 2022-01-01 VITALS
BODY MASS INDEX: 25.32 KG/M2 | HEIGHT: 60 IN | DIASTOLIC BLOOD PRESSURE: 72 MMHG | OXYGEN SATURATION: 90 % | WEIGHT: 129 LBS | HEART RATE: 85 BPM | SYSTOLIC BLOOD PRESSURE: 147 MMHG

## 2022-01-01 DIAGNOSIS — M54.31 SCIATICA OF RIGHT SIDE: ICD-10-CM

## 2022-01-01 DIAGNOSIS — D05.11 DUCTAL CARCINOMA IN SITU (DCIS) OF RIGHT BREAST: ICD-10-CM

## 2022-01-01 DIAGNOSIS — F51.01 PRIMARY INSOMNIA: ICD-10-CM

## 2022-01-01 DIAGNOSIS — E78.5 DYSLIPIDEMIA: ICD-10-CM

## 2022-01-01 DIAGNOSIS — I10 HYPERTENSION, UNSPECIFIED TYPE: ICD-10-CM

## 2022-01-01 DIAGNOSIS — R73.9 HYPERGLYCEMIA: ICD-10-CM

## 2022-01-01 DIAGNOSIS — H61.23 BILATERAL IMPACTED CERUMEN: ICD-10-CM

## 2022-01-01 DIAGNOSIS — R73.03 PREDIABETES: ICD-10-CM

## 2022-01-01 DIAGNOSIS — Z00.6 RESEARCH STUDY PATIENT: ICD-10-CM

## 2022-01-01 DIAGNOSIS — M81.0 AGE-RELATED OSTEOPOROSIS WITHOUT CURRENT PATHOLOGICAL FRACTURE: ICD-10-CM

## 2022-01-01 LAB
ALBUMIN SERPL BCP-MCNC: 4.3 G/DL (ref 3.2–4.9)
ALBUMIN/GLOB SERPL: 1.5 G/DL
ALP SERPL-CCNC: 50 U/L (ref 30–99)
ALT SERPL-CCNC: 16 U/L (ref 2–50)
ANION GAP SERPL CALC-SCNC: 11 MMOL/L (ref 7–16)
APOB+LDLR+PCSK9 GENE MUT ANL BLD/T: NOT DETECTED
AST SERPL-CCNC: 16 U/L (ref 12–45)
BILIRUB SERPL-MCNC: 0.9 MG/DL (ref 0.1–1.5)
BRCA1+BRCA2 DEL+DUP + FULL MUT ANL BLD/T: NOT DETECTED
BUN SERPL-MCNC: 19 MG/DL (ref 8–22)
CALCIUM SERPL-MCNC: 9.5 MG/DL (ref 8.5–10.5)
CHLORIDE SERPL-SCNC: 107 MMOL/L (ref 96–112)
CHOLEST SERPL-MCNC: 157 MG/DL (ref 100–199)
CO2 SERPL-SCNC: 24 MMOL/L (ref 20–33)
CREAT SERPL-MCNC: 0.89 MG/DL (ref 0.5–1.4)
EST. AVERAGE GLUCOSE BLD GHB EST-MCNC: 120 MG/DL
FASTING STATUS PATIENT QL REPORTED: NORMAL
GFR SERPLBLD CREATININE-BSD FMLA CKD-EPI: 63 ML/MIN/1.73 M 2
GLOBULIN SER CALC-MCNC: 2.8 G/DL (ref 1.9–3.5)
GLUCOSE SERPL-MCNC: 104 MG/DL (ref 65–99)
HBA1C MFR BLD: 5.8 % (ref 4–5.6)
HDLC SERPL-MCNC: 53 MG/DL
LDLC SERPL CALC-MCNC: 75 MG/DL
MLH1+MSH2+MSH6+PMS2 GN DEL+DUP+FUL M: NOT DETECTED
POTASSIUM SERPL-SCNC: 4.1 MMOL/L (ref 3.6–5.5)
PROT SERPL-MCNC: 7.1 G/DL (ref 6–8.2)
SODIUM SERPL-SCNC: 142 MMOL/L (ref 135–145)
TRIGL SERPL-MCNC: 144 MG/DL (ref 0–149)

## 2022-01-01 PROCEDURE — 97110 THERAPEUTIC EXERCISES: CPT

## 2022-01-01 PROCEDURE — 83036 HEMOGLOBIN GLYCOSYLATED A1C: CPT

## 2022-01-01 PROCEDURE — 97140 MANUAL THERAPY 1/> REGIONS: CPT

## 2022-01-01 PROCEDURE — 99213 OFFICE O/P EST LOW 20 MIN: CPT | Performed by: FAMILY MEDICINE

## 2022-01-01 PROCEDURE — 80053 COMPREHEN METABOLIC PANEL: CPT

## 2022-01-01 PROCEDURE — 97112 NEUROMUSCULAR REEDUCATION: CPT

## 2022-01-01 PROCEDURE — 99214 OFFICE O/P EST MOD 30 MIN: CPT | Performed by: FAMILY MEDICINE

## 2022-01-01 PROCEDURE — 80061 LIPID PANEL: CPT

## 2022-01-01 PROCEDURE — 36415 COLL VENOUS BLD VENIPUNCTURE: CPT

## 2022-01-01 PROCEDURE — 97162 PT EVAL MOD COMPLEX 30 MIN: CPT

## 2022-01-01 RX ORDER — CYCLOSPORINE 0.5 MG/ML
1 EMULSION OPHTHALMIC
COMMUNITY
End: 2022-01-01

## 2022-01-01 RX ORDER — ROSUVASTATIN CALCIUM 20 MG/1
20 TABLET, COATED ORAL EVERY EVENING
Qty: 100 TABLET | Refills: 3 | Status: SHIPPED
Start: 2022-01-01 | End: 2023-01-01

## 2022-01-01 RX ORDER — PREDNISOLONE ACETATE 10 MG/ML
SUSPENSION/ DROPS OPHTHALMIC
COMMUNITY
Start: 2022-05-27 | End: 2022-01-01

## 2022-01-01 RX ORDER — PREDNISONE 5 MG/1
TABLET ORAL
COMMUNITY
Start: 2022-04-22 | End: 2022-01-01

## 2022-01-01 RX ORDER — NEOMYCIN SULFATE, POLYMYXIN B SULFATE AND DEXAMETHASONE 3.5; 10000; 1 MG/ML; [USP'U]/ML; MG/ML
SUSPENSION/ DROPS OPHTHALMIC
COMMUNITY
Start: 2022-04-22 | End: 2022-01-01

## 2022-01-01 RX ORDER — ALENDRONATE SODIUM 70 MG/1
70 TABLET ORAL
Qty: 14 TABLET | Refills: 3 | Status: SHIPPED
Start: 2022-01-01 | End: 2023-01-01 | Stop reason: SINTOL

## 2022-01-01 RX ORDER — LOSARTAN POTASSIUM 50 MG/1
50 TABLET ORAL DAILY
Qty: 100 TABLET | Refills: 3 | Status: SHIPPED
Start: 2022-01-01 | End: 2023-01-01

## 2022-01-01 SDOH — ECONOMIC STABILITY: GENERAL: QUALITY OF LIFE: GOOD

## 2022-01-01 ASSESSMENT — ENCOUNTER SYMPTOMS
PAIN TIMING: INTERMITTENT
ALLEVIATING FACTORS: PAIN MEDICATION
PAIN TIMING: OCCASIONAL
PAIN SCALE AT HIGHEST: 8
QUALITY: SQUEEZING
ALLEVIATING FACTORS: BACK BRACE
EXACERBATED BY: CARRYING
EXACERBATED BY: PROLONGED STANDING
EXACERBATED BY: PROLONGED SITTING
ALLEVIATING FACTORS: ACTIVITY MODIFICATION
PAIN SCALE: 0
EXACERBATED BY: BENDING
PAIN SCALE AT LOWEST: 0
QUALITY: SHARP

## 2022-01-19 ENCOUNTER — OFFICE VISIT (OUTPATIENT)
Dept: MEDICAL GROUP | Facility: CLINIC | Age: 87
End: 2022-01-19
Payer: MEDICARE

## 2022-01-19 VITALS
HEART RATE: 92 BPM | DIASTOLIC BLOOD PRESSURE: 80 MMHG | HEIGHT: 62 IN | BODY MASS INDEX: 23.92 KG/M2 | SYSTOLIC BLOOD PRESSURE: 120 MMHG | WEIGHT: 130 LBS | OXYGEN SATURATION: 99 % | TEMPERATURE: 98 F

## 2022-01-19 DIAGNOSIS — D05.11 DUCTAL CARCINOMA IN SITU (DCIS) OF RIGHT BREAST: ICD-10-CM

## 2022-01-19 DIAGNOSIS — I10 HYPERTENSION, UNSPECIFIED TYPE: ICD-10-CM

## 2022-01-19 DIAGNOSIS — R73.09 ELEVATED GLUCOSE: ICD-10-CM

## 2022-01-19 DIAGNOSIS — R73.9 HYPERGLYCEMIA: ICD-10-CM

## 2022-01-19 DIAGNOSIS — G60.9 IDIOPATHIC PERIPHERAL NEUROPATHY: ICD-10-CM

## 2022-01-19 PROCEDURE — 99214 OFFICE O/P EST MOD 30 MIN: CPT | Performed by: FAMILY MEDICINE

## 2022-01-19 ASSESSMENT — PATIENT HEALTH QUESTIONNAIRE - PHQ9: CLINICAL INTERPRETATION OF PHQ2 SCORE: 0

## 2022-01-19 NOTE — ASSESSMENT & PLAN NOTE
Briseida continues to mention that she has a feeling of tingling or things crawling on her lower extremities.  She has good sensation bilaterally in her feet.  We had a long discussion about possible peripheral neuropathy but at this time I do not feel any treatment is warranted and she agrees.

## 2022-01-19 NOTE — ASSESSMENT & PLAN NOTE
Briseida had ductal carcinoma in situ in 2019 with surgery.  She had no recurrence and is followed by oncology.

## 2022-01-19 NOTE — ASSESSMENT & PLAN NOTE
Normal was noted to have an elevated sugar on a comprehensive evaluation with Dr. Chin.  We attempted to do a hemoglobin A1c in the office and it read to too high to read.  I have ordered CMP fasting and an A1c before her next visit.  She has no symptoms of hypoglycemia at this time.

## 2022-01-19 NOTE — PROGRESS NOTES
Subjective:     Chief Complaint   Patient presents with   • Follow-Up     High lavels sugars leanna / poss pre Neuropathy     Briseida Olivares is a 86 y.o. female here today for     Problem   Hyperglycemia   Hypertension   Peripheral Neuropathy   Ductal Carcinoma in Situ (Dcis) of Right Breast      Overall Tee is doing exceptionally well.  She continues to drive and has been traveling around the country to visit grandchildren.  She is fully vaccinated including booster shots for COVID.  She values maintaining her independence and quality of life much more than aggressive treatment to extend quantity of life if quality is sacrificed.    Current medicines (including changes today)  Current Outpatient Medications   Medication Sig Dispense Refill   • losartan (COZAAR) 50 MG Tab Take 50 mg by mouth every day.     • rosuvastatin (CRESTOR) 20 MG Tab Take 20 mg by mouth every evening.     • Multiple Vitamins-Minerals (PRESERVISION AREDS 2) Cap Take  by mouth every bedtime.     • Coenzyme Q10-Vitamin E (COQ10 ST-100 PO) Take  by mouth every day.     • vitamin D (CHOLECALCIFEROL) 1000 UNIT Tab Take 1,000 Units by mouth 2 Times a Day.     • Biotin 72684 MCG Tab Take  by mouth every day.     • Glucosamine-Chondroit-Vit C-Mn (GLUCOSAMINE CHONDR 1500 COMPLX) Cap Take  by mouth every day.     • Calcium Carb-Cholecalciferol (CALCIUM 1000 + D PO) Take  by mouth 2 Times a Day.     • cyclosporin (RESTASIS) 0.05 % ophthalmic emulsion Place 1 Drop in both eyes 2 times a day.     • NON SPECIFIED Systane I caps, daily     • NON SPECIFIED Hydro Eye 2 tabs twice a day       No current facility-administered medications for this visit.       Social History     Tobacco Use   • Smoking status: Never Smoker   • Smokeless tobacco: Never Used   Vaping Use   • Vaping Use: Never used   Substance Use Topics   • Alcohol use: Not Currently     Comment: 3 times a year   • Drug use: Never     Past Medical History:   Diagnosis Date   • Arthritis     all  "over   • Bowel habit changes     constipation   • Cancer (HCC)     DCIS   • Dry cough    • Heart burn    • High cholesterol    • Hypertension    • Pain     arthritis, right shoulder bursitis   • Urinary incontinence     wears a pad all the time      Family History   Problem Relation Age of Onset   • Cancer Sister         breast   • Cancer Paternal Aunt         breast   • Cancer Paternal Aunt         breast   • Cancer Paternal Aunt         breast   • Cancer Paternal Aunt         breast   • Cancer Paternal Aunt         breast         Objective:     Vitals:    01/19/22 1021   BP: 120/80   BP Location: Right arm   Pulse: 92   Temp: 36.7 °C (98 °F)   SpO2: 99%   Weight: 59 kg (130 lb)   Height: 1.575 m (5' 2\")     Body mass index is 23.78 kg/m².     Physical Exam:   Gen: Well developed, well nourished in no acute distress.   Skin: Pink, warm, and dry  HEENT: conjunctiva non-injected, sclera non-icteric. EOMs intact.   Nasal mucosa without edema nor erythema.   Pinna normal.    Oral mucous membranes pink and moist with no lesions.  Neck: Supple, trachea midline. No adenopathy or masses in the neck or supraclavicular regions.  Lungs: Effort is normal. Clear to auscultation bilaterally with good excursion.  CV: regular rate and rhythm, no murmurs  Abdomen: soft, nontender, + BS. No HSM.  No CVAT  Ext: no edema, color normal, vascularity normal, temperature normal  Alert and oriented Eye contact is good, speech goal directed, affect calm    Assessment and Plan:   Ductal carcinoma in situ (DCIS) of right breast  Briseida had ductal carcinoma in situ in 2019 with surgery.  She had no recurrence and is followed by oncology.    Hyperglycemia  Normal was noted to have an elevated sugar on a comprehensive evaluation with Dr. Chin.  We attempted to do a hemoglobin A1c in the office and it read to too high to read.  I have ordered CMP fasting and an A1c before her next visit.  She has no symptoms of hypoglycemia at this " time.      Hypertension  Blood pressures well controlled on losartan.  No change in medication.    Peripheral neuropathy  Briseida continues to mention that she has a feeling of tingling or things crawling on her lower extremities.  She has good sensation bilaterally in her feet.  We had a long discussion about possible peripheral neuropathy but at this time I do not feel any treatment is warranted and she agrees.      Followup: No follow-ups on file.    Sathish Pandya M.D.

## 2022-01-31 ENCOUNTER — HOSPITAL ENCOUNTER (OUTPATIENT)
Dept: LAB | Facility: MEDICAL CENTER | Age: 87
End: 2022-01-31
Attending: FAMILY MEDICINE
Payer: MEDICARE

## 2022-01-31 DIAGNOSIS — R73.09 ELEVATED GLUCOSE: ICD-10-CM

## 2022-01-31 LAB
ALBUMIN SERPL BCP-MCNC: 4.4 G/DL (ref 3.2–4.9)
ALBUMIN/GLOB SERPL: 1.5 G/DL
ALP SERPL-CCNC: 59 U/L (ref 30–99)
ALT SERPL-CCNC: 19 U/L (ref 2–50)
ANION GAP SERPL CALC-SCNC: 14 MMOL/L (ref 7–16)
AST SERPL-CCNC: 18 U/L (ref 12–45)
BILIRUB SERPL-MCNC: 1 MG/DL (ref 0.1–1.5)
BUN SERPL-MCNC: 18 MG/DL (ref 8–22)
CALCIUM SERPL-MCNC: 9.8 MG/DL (ref 8.5–10.5)
CHLORIDE SERPL-SCNC: 107 MMOL/L (ref 96–112)
CO2 SERPL-SCNC: 20 MMOL/L (ref 20–33)
CREAT SERPL-MCNC: 0.98 MG/DL (ref 0.5–1.4)
EST. AVERAGE GLUCOSE BLD GHB EST-MCNC: 123 MG/DL
GLOBULIN SER CALC-MCNC: 3 G/DL (ref 1.9–3.5)
GLUCOSE SERPL-MCNC: 103 MG/DL (ref 65–99)
HBA1C MFR BLD: 5.9 % (ref 4–5.6)
POTASSIUM SERPL-SCNC: 3.9 MMOL/L (ref 3.6–5.5)
PROT SERPL-MCNC: 7.4 G/DL (ref 6–8.2)
SODIUM SERPL-SCNC: 141 MMOL/L (ref 135–145)

## 2022-01-31 PROCEDURE — 36415 COLL VENOUS BLD VENIPUNCTURE: CPT

## 2022-01-31 PROCEDURE — 80053 COMPREHEN METABOLIC PANEL: CPT

## 2022-01-31 PROCEDURE — 83036 HEMOGLOBIN GLYCOSYLATED A1C: CPT

## 2022-02-18 RX ORDER — SIMVASTATIN 40 MG
TABLET ORAL
COMMUNITY
Start: 2020-07-02 | End: 2022-01-01

## 2022-02-18 RX ORDER — ALENDRONATE SODIUM 70 MG/1
TABLET ORAL
COMMUNITY
Start: 2022-01-04 | End: 2022-01-01 | Stop reason: SDUPTHER

## 2022-02-22 ENCOUNTER — TELEMEDICINE (OUTPATIENT)
Dept: MEDICAL GROUP | Facility: CLINIC | Age: 87
End: 2022-02-22
Payer: MEDICARE

## 2022-02-22 DIAGNOSIS — R73.9 HYPERGLYCEMIA: ICD-10-CM

## 2022-02-22 PROCEDURE — 99213 OFFICE O/P EST LOW 20 MIN: CPT | Mod: 95 | Performed by: FAMILY MEDICINE

## 2022-02-22 NOTE — PROGRESS NOTES
Virtual Visit: Established Patient   This visit was conducted via zoom using secure and encrypted videoconferencing technology.   The patient was in their home in the state [facility].    The patient's identity was confirmed and verbal consent was obtained for this virtual visit.    Subjective:   CC: No chief complaint on file.    Briseida Olivares is a 86 y.o. female presenting for follow up of  Recent HbA1c.  She had a markedly elevated A1c in the office, but this did not make sense due to her glucose only being in the low 100s.  So we ordered a repeat lab with a hemoglobin A1c and a CMP.  She states she is watching her diet little bit more than she was but really is not getting overly concerned about it.    ROS   neg    Current medicines (including changes today)  Current Outpatient Medications   Medication Sig Dispense Refill   • simvastatin (ZOCOR) 40 MG Tab SIMVASTATIN 40 MG TABS     • LOSARTAN POTASSIUM PO Take 1 Tablet by mouth every day.     • alendronate (FOSAMAX) 70 MG Tab      • losartan (COZAAR) 50 MG Tab Take 50 mg by mouth every day.     • rosuvastatin (CRESTOR) 20 MG Tab Take 20 mg by mouth every evening.     • Multiple Vitamins-Minerals (PRESERVISION AREDS 2) Cap Take  by mouth every bedtime.     • Coenzyme Q10-Vitamin E (COQ10 ST-100 PO) Take  by mouth every day.     • vitamin D (CHOLECALCIFEROL) 1000 UNIT Tab Take 1,000 Units by mouth 2 Times a Day.     • Biotin 78610 MCG Tab Take  by mouth every day.     • Glucosamine-Chondroit-Vit C-Mn (GLUCOSAMINE CHONDR 1500 COMPLX) Cap Take  by mouth every day.     • Calcium Carb-Cholecalciferol (CALCIUM 1000 + D PO) Take  by mouth 2 Times a Day.     • cyclosporin (RESTASIS) 0.05 % ophthalmic emulsion Place 1 Drop in both eyes 2 times a day.     • NON SPECIFIED Systane I caps, daily     • NON SPECIFIED Hydro Eye 2 tabs twice a day       No current facility-administered medications for this visit.       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    Physical Exam:  Constitutional: Alert, no distress, well-groomed.  Skin: No rashes in visible areas.  Eye: Round. Conjunctiva clear, lids normal. No icterus.   ENMT: Lips pink without lesions, good dentition, moist mucous membranes. Phonation normal.  Neck: No masses, no thyromegaly. Moves freely without pain.  Respiratory: Unlabored respiratory effort, no cough or audible wheeze  Psych: Alert and oriented x3, normal affect and mood.     Assessment and Plan:   The following treatment plan was discussed:   Her hemoglobin A1c was 5.9.  Her CMP was essentially unremarkable.  I instructed her, and her daughter who was on the call, that she really does not need to worry about her hemoglobin A1c at this point.  I recommend we really not check her labs unless there is an indication for.  She and her daughter seem to agree with this plan.    Follow-up: No follow-ups on file.     Time: 15mins

## 2022-05-09 RX ORDER — LOSARTAN POTASSIUM 50 MG/1
50 TABLET ORAL DAILY
Qty: 30 TABLET | Refills: 0 | Status: SHIPPED | OUTPATIENT
Start: 2022-05-09 | End: 2022-06-01

## 2022-05-09 NOTE — TELEPHONE ENCOUNTER
Received request via: Patient    Was the patient seen in the last year in this department? Yes    Does the patient have an active prescription (recently filled or refills available) for medication(s) requested? No     Briseida lost her medication while traveling and has been out of her BP medication, losartan 50MGsince 5/3. She has mail-order replacements coming but they will not arrive until later this week.     Please refill a bridge-script to the patient's local pharmacy so that she can safely await the delivery of her mail-order script.

## 2022-05-19 ENCOUNTER — TELEPHONE (OUTPATIENT)
Dept: MEDICAL GROUP | Facility: CLINIC | Age: 87
End: 2022-05-19
Payer: MEDICARE

## 2022-05-19 DIAGNOSIS — D05.11 DUCTAL CARCINOMA IN SITU (DCIS) OF RIGHT BREAST: ICD-10-CM

## 2022-05-19 NOTE — TELEPHONE ENCOUNTER
----- Message from Philly Oconnell sent at 5/18/2022 10:14 AM PDT -----  Regarding: Diagnostic Mammogram  Hello,    This patient called in to schedule her annual mammogram. However, she stated she is experiencing pain (often at night time), sometimes shooting pain in both breasts. Since she is experiencing pain, we would need her mammogram ordered as a bilateral diagnostic mammogram. If possible, could you please submit an updated order for the patient.    Thank you.

## 2022-06-10 ENCOUNTER — HOSPITAL ENCOUNTER (OUTPATIENT)
Dept: RADIOLOGY | Facility: MEDICAL CENTER | Age: 87
End: 2022-06-10
Attending: FAMILY MEDICINE
Payer: MEDICARE

## 2022-06-10 DIAGNOSIS — D05.11 DUCTAL CARCINOMA IN SITU (DCIS) OF RIGHT BREAST: ICD-10-CM

## 2022-06-10 PROCEDURE — G0279 TOMOSYNTHESIS, MAMMO: HCPCS

## 2022-06-10 PROCEDURE — 76642 ULTRASOUND BREAST LIMITED: CPT | Mod: RT

## 2022-07-06 PROBLEM — M54.31 SCIATICA OF RIGHT SIDE: Status: ACTIVE | Noted: 2022-01-01

## 2022-07-06 NOTE — PATIENT INSTRUCTIONS
Please schedule a medicare wellness with me  Please have someone set up a medication reminder on your phone.   I placed a referral for physical therapy, for your sciatica, and for psychology to see a specialist in sleep medicine.

## 2022-07-06 NOTE — PROGRESS NOTES
Subjective:     Chief Complaint   Patient presents with   • Annual Exam   • Back Pain     Briseida Olivares is a 86 y.o. female here today for     Problem   Sciatica of Right Side    Normal reports she gets occasional sciatica in the right leg.  Is primarily in the buttocks region occasionally goes down the leg a little bit.  It flared up a little while ago and although it is still present is much less severe than it was.  She denies any bladder or bowel dysfunction.  She states it is tolerable, just more of a nuisance.     Dyslipidemia    Briseida states she occasionally forgets to take her rosuvastatin.  But when she does take it she notices no side effects.     Hypertension    Briseida reports occasionally forgetting to take her losartan but has no side effects of medication.  She does not routinely monitor her blood pressure but it has not been elevated at other office visits.     Age-Related Osteoporosis Without Current Pathological Fracture    We are unsure how long she has been on the alendronate.  She would like to continue it however.     Ductal Carcinoma in Situ (Dcis) of Right Breast    She had DCIS of the right breast in 2019.  She had a lumpectomy.  She gets annual mammograms and ultrasounds.          Current medicines (including changes today)  Current Outpatient Medications   Medication Sig Dispense Refill   • prednisoLONE acetate (PRED FORTE) 1 % Suspension INSTIL 1 DROP INTO BOTH EYES TWICE A DAY     • neomycin-polymyxin-dexamethasone (MAXITROL) [id number] Suspension ophthalmic suspension PLACE 2 DROPS INTO BOTH EYES 3 TIMES A DAY FOR 7 DAYS.     • vitamin D (VITAMIND D3) 1000 UNIT Tab Take 1,000 Units by mouth.     • rosuvastatin (CRESTOR) 20 MG Tab Take 1 Tablet by mouth every evening. 100 Tablet 3   • losartan (COZAAR) 50 MG Tab Take 1 Tablet by mouth every day. 100 Tablet 3   • alendronate (FOSAMAX) 70 MG Tab Take 1 Tablet by mouth every 7 days. 14 Tablet 3   • Multiple Vitamins-Minerals (PRESERVISION  AREDS 2) Cap Take  by mouth every bedtime.     • Coenzyme Q10-Vitamin E (COQ10 ST-100 PO) Take  by mouth every day.     • vitamin D (CHOLECALCIFEROL) 1000 UNIT Tab Take 1,000 Units by mouth 2 Times a Day.     • Biotin 68430 MCG Tab Take  by mouth every day.     • Glucosamine-Chondroit-Vit C-Mn (GLUCOSAMINE CHONDR 1500 COMPLX) Cap Take  by mouth every day.     • Calcium Carb-Cholecalciferol (CALCIUM 1000 + D PO) Take  by mouth 2 Times a Day.     • cyclosporin (RESTASIS) 0.05 % ophthalmic emulsion Place 1 Drop in both eyes 2 times a day.     • NON SPECIFIED Systane I caps, daily     • NON SPECIFIED Hydro Eye 2 tabs twice a day     • predniSONE (DELTASONE) 5 MG Tab TAKE 1 TABLET BY MOUTH 2 TIMES A DAY WITH MEALS FOR 7 DAYS.INSURANCE (Patient not taking: Reported on 7/6/2022)     • cyclosporin (RESTASIS) 0.05 % ophthalmic emulsion Administer 1 Drop into affected eye(s). (Patient not taking: Reported on 7/6/2022)       No current facility-administered medications for this visit.       Social History     Tobacco Use   • Smoking status: Never Smoker   • Smokeless tobacco: Never Used   Vaping Use   • Vaping Use: Never used   Substance Use Topics   • Alcohol use: Not Currently     Comment: 3 times a year   • Drug use: Never     Past Medical History:   Diagnosis Date   • Arthritis     all over   • Bowel habit changes     constipation   • Cancer (HCC)     DCIS   • Dry cough    • Heart burn    • High cholesterol    • Hypertension    • Pain     arthritis, right shoulder bursitis   • Urinary incontinence     wears a pad all the time      Family History   Problem Relation Age of Onset   • Cancer Sister         breast   • Cancer Paternal Aunt         breast   • Cancer Paternal Aunt         breast   • Cancer Paternal Aunt         breast   • Cancer Paternal Aunt         breast   • Cancer Paternal Aunt         breast         Objective:     Vitals:    07/06/22 1343   BP: 119/76   Pulse: 96   Resp: 16   Temp: 36.1 °C (97 °F)   TempSrc:  "Temporal   SpO2: 94%   Weight: 58.2 kg (128 lb 6 oz)   Height: 1.575 m (5' 2\")     Body mass index is 23.48 kg/m².     Physical Exam:   Gen: Well developed, well nourished in no acute distress.   Skin: Pink, warm, and dry  HEENT: conjunctiva non-injected, sclera non-icteric. EOMs intact.   Nasal mucosa without edema nor erythema.   Pinna normal.    Oral mucous membranes pink and moist with no lesions.  Neck: Supple, trachea midline. No adenopathy or masses in the neck or supraclavicular regions.  Lungs: Effort is normal. Clear to auscultation bilaterally with good excursion.  CV: regular rate and rhythm, no murmurs  Abdomen: soft, nontender, + BS. No HSM.  No CVAT  Ext: no edema, color normal, vascularity normal, temperature normal  NEG SLR BILAT, PATAELAR DTR'S 1+  Alert and oriented Eye contact is good, speech goal directed, affect calm    Assessment and Plan:   Dyslipidemia  We will check another lipid panel and continue the rosuvastatin for now.    Ductal carcinoma in situ (DCIS) of right breast  Recent mammogram and ultrasound were both negative.  Continue routine follow-up.    Age-related osteoporosis without current pathological fracture  Continue alendronate for at least 1 more year.    Sciatica of right side  At this time her exam was negative with negative straight leg raise and 1+ DTRs bilaterally.  She is able to ambulate quite comfortably and stably.  We discussed various options and I showed her some piriformis stretching.  I will also refer her to physical therapy.      Followup: Return in about 4 weeks (around 8/3/2022).    Sathish Pandya M.D.  "

## 2022-07-18 NOTE — TELEPHONE ENCOUNTER
Member called to find out zip code for location and what time her appointment was for. Information was provided.

## 2022-07-19 NOTE — OP THERAPY EVALUATION
Outpatient Physical Therapy  INITIAL EVALUATION    [facility] [place] [address] [address] [facility], [address]  [place] [address]  Phone:  [phone]    Date of Evaluation: 2022    Patient: Briseida Olivares  YOB: 1935  MRN: [id number]     Referring Provider: Sathish Pandya M.D.  [address] [place] [address]   Referring Diagnosis Sciatica of right side [M54.31]     Time Calculation  Start time: 0245  Stop time: 0330 Time Calculation (min): 45 minutes         Chief Complaint: Back Problem    Visit Diagnoses     ICD-10-CM   1. Sciatica of right side  M54.31       Date of onset of impairment: 2022    Subjective:   History of Present Illness:     Date of onset:  2022    Mechanism of injury:  The patient is a 86 year old female who reports (R) LE pain recently and historically through many years. The patient has pain while standing and getting up from a seated position or standing washing dishes or brushing her teeth. The patient has a hx of (L) TKA done ~10-12 years ago; (R) partial knee arthroplasty ~10-12 year ago.  Quality of life:  Good  Headaches:  no headaches  Ear problems: none  Pain:     Current pain ratin    At best pain ratin    At worst pain ratin    Quality:  Sharp and squeezing    Pain timing:  Occasional and intermittent    Relieving factors:  Back brace, activity modification and pain medication    Aggravating factors:  Bending, carrying, prolonged standing and prolonged sitting    Pain Comments::  Transitioning from seated to standing increases her pain   Social Support:     Lives in:  Multiple-level home  Hand dominance:  Right  Patient Goals:     Patient goals for therapy:  Decreased pain and increased strength    Other patient goals:  To learn how to alleviate her pain with stretching       Past Medical History:   Diagnosis Date   • Arthritis     all over   • Bowel habit changes     constipation   • Cancer (HCC)     DCIS   • Dry cough    • Heart  burn    • High cholesterol    • Hypertension    • Pain     arthritis, right shoulder bursitis   • Urinary incontinence     wears a pad all the time     Past Surgical History:   Procedure Laterality Date   • LUMPECTOMY Right 11/13/2019    Procedure: LUMPECTOMY, BREAST- WIRE LOCALIZED;  Surgeon: Oleg Nguyen M.D.;  Location: SURGERY SAME DAY [facility];  Service: General   • OTHER Left 2009    breast biopsy   • GYN SURGERY  1970's    hysterectomy   • OTHER  1940's    tonsillectomy   • OTHER ORTHOPEDIC SURGERY Right     total knee arthroplasty   • OTHER ORTHOPEDIC SURGERY Left     partial knee arthroplasty   • OTHER ORTHOPEDIC SURGERY Right     bunionectomy     Social History     Tobacco Use   • Smoking status: Never Smoker   • Smokeless tobacco: Never Used   Substance Use Topics   • Alcohol use: Not Currently     Comment: 3 times a year     Family and Occupational History     Socioeconomic History   • Marital status:      Spouse name: Not on file   • Number of children: Not on file   • Years of education: Not on file   • Highest education level: Not on file   Occupational History   • Not on file       Objective     Observations   Central spine     Positive for lumbar scoliosis.  Spine (left)      Positive for PSIS high.  Spine (right)     Positive for PSIS low.    Additional Observation Details  Tightness bilateral hip flexors; increased tightness to the (R) upper back and shoulder during trunk rotation to (R) side    Postural Observations  Seated posture: fair  Standing posture: fair  Correction of posture: has no consistent effect        Active Range of Motion     Lumbar   Flexion: within functional limits  Extension: decreased  Left lateral flexion: within functional limits  Right lateral flexion: within functional limits  Left rotation: decreased  Right rotation: decreased    Additional Active Range of Motion Details  Increased tightness to the (R) upper back and shoulder while rotating to the (R)  side    Strength:      Abdominals   Left: 4+  Right: 4+  Lower abdominals: Able to maintain neutral statically    Back   Flexion: 4+  Extension: 4+  Lateral bend left: 4+  Lateral bend right: 4+    Left Hip   Planes of Motion   Flexion: 5  Extension: 5  Abduction: 5  Adduction: 5    Right Hip   Planes of Motion   Flexion: 5  Extension: 4+  Abduction: 4+  Adduction: 5    Left Knee   Flexion: 5  Extension: 5    Right Knee   Flexion: 5  Extension: 5    Left Ankle/Foot   Dorsiflexion: 5  Plantar flexion: 5    Right Ankle/Foot   Dorsiflexion: 5  Plantar flexion: 5    Tests       Lumbar spine (left)      Negative slump.   Lumbar spine (right)     Positive slump.     Left Pelvic Girdle/Sacrum   Positive: sacral rotation and upslip.     Left Hip   SLR: Negative.     Right Hip   SLR: Negative.         Therapeutic Exercises (CPT 08227):     1. Posterior pelvic tilts , 10x     2. Bridges , 10x     3. (L) hip flexion/ (L) hip extension , 5x for 5 seconds     4. Piriformis stretch, 3 x30sec     Therapeutic Treatments and Modalities:     1. Manual Therapy (CPT 49182), (L) hip , contract relax isometric to (L) hip; (R) hip extension isometrics, (R) glute hip , IASTM to glute hip     Time-based treatments/modalities:    Physical Therapy Timed Treatment Charges  Therapeutic exercise minutes (CPT 95134): 10 minutes      Assessment, Response and Plan:   Impairments: activity intolerance, impaired physical strength, lacks appropriate home exercise program, limited mobility, pain with function and weight-bearing intolerance    Assessment details:  The patient is an 86 year old female who reports (R) LE pain with standing tasks weightbearing for prolonged periods, going up and down steps, getting up from seated positions; standing brushing her teeth. Patient would benefit from skilled physical therapy to address possible (R) LE lumbar radiculopathy/ (L) SIJ dysfunction upslip posterior rotation. Patient will work on PROM/AROM, manual  therapy techniques, strength and conditioning, functional training and activity tolerance.   Barriers to therapy:  None  Prognosis: good    Goals:   Short Term Goals:   1) Indep with HEP   2) Able to get up from seated positions 25% of the time with less pain by 1-2 levels on VAS  3) Standing to brush her teeth wit less frequency of pain 25% of the time  Short term goal time span:  2-4 weeks      Long Term Goals:    1) Progression/regression with HEP   2) Able to get up from ground level positions   3) Decreased pain to the (R) LE 50% or more of the time standing   4) Increased AROM in trunk flexion by 25%   Long term goal time span:  4-6 weeks    Plan:   Therapy options:  Physical therapy treatment to continue  Planned therapy interventions:  Neuromuscular Re-education (CPT 22307), Self Care ADL Training (CPT 02947), Therapeutic Activities (CPT 97283) and Therapeutic Exercise (CPT 16850)  Frequency:  2x week  Duration in weeks:  6  Duration in visits:  12  Discussed with:  Patient  Plan details:  Functional Training, Self care: 89152 (1)   Neuromuscular Re-education: 07195 (1)   Therapeutic Activities: 24953 (1)   Therapeutic Exercise: 70467 (1)        Functional Assessment Used        Referring provider co-signature:  I have reviewed this plan of care and my co-signature certifies the need for services.    Certification Period: 07/19/2022 to  08/30/22    Physician Signature: ________________________________ Date: ______________

## 2022-07-25 PROBLEM — N18.31 STAGE 3A CHRONIC KIDNEY DISEASE: Status: ACTIVE | Noted: 2022-01-01

## 2022-07-25 PROBLEM — M54.41 CHRONIC RIGHT-SIDED LOW BACK PAIN WITH RIGHT-SIDED SCIATICA: Status: ACTIVE | Noted: 2022-01-01

## 2022-07-25 PROBLEM — R73.03 PREDIABETES: Status: ACTIVE | Noted: 2022-01-01

## 2022-07-25 PROBLEM — G89.29 CHRONIC RIGHT-SIDED LOW BACK PAIN WITH RIGHT-SIDED SCIATICA: Status: ACTIVE | Noted: 2022-01-01

## 2022-07-29 NOTE — OP THERAPY DAILY TREATMENT
Outpatient Physical Therapy  DAILY TREATMENT     [facility] [address] [facility], [address]  [place] [address]  Phone:  [phone]    Date: 07/29/2022    Patient: Briseida Olivares  YOB: 1935  MRN: [id number]     Time Calculation    Start time: 0930  Stop time: 1015 Time Calculation (min): 45 minutes         Chief Complaint: Hip Problem and Back Problem    Visit #: 2    SUBJECTIVE:  The patient reports being free of her sciatica symptoms however when she has been doing her dishes she starts to feel increased pain.     OBJECTIVE:  Current objective measures:       Re-assess (L) SIJ dysfunction; decrease low back and (L) hip pain    Therapeutic Exercises (CPT 25937):     1. Posterior pelvic tilts / ab crunches , 10x     2. Bridges , 10x     3. (L) hip flexion/ (L) hip extension , 5x for 5 seconds     4. Piriformis stretch, 3 x30sec     5. Clamshells    6. Hip abduction    7. Nu step     Therapeutic Treatments and Modalities:     1. Manual Therapy (CPT 07978), (L) hip , contract relax isometric to (L) hip; (R) hip extension isometrics, (R) glute hip , IASTM to glute hip     Time-based treatments/modalities:    Physical Therapy Timed Treatment Charges  Manual therapy minutes (CPT 99522): 15 minutes  Neuromusc re-ed, balance, coor, post minutes (CPT 34762): 15 minutes  Therapeutic exercise minutes (CPT 36186): 15 minutes      ASSESSMENT:   Response to treatment:   IASTM to the (L) hip to the glute medius, proximal to distal; patient reported iniital tenderness but dissipating as treatment extended. Re-assessed (L) SIJ; slight rotation of (L) innominate posteriorly; educated patient on re-alignment techniques ; muscle energy (L) hip extension and (R) hip flexion. Observed/ assessed (L) innominate following treatment; increased alignment with less posterior rotation. Patient reported that she felt her (L) leg was extended more at rest; less pain in hip and low back         PLAN/RECOMMENDATIONS:   Plan for treatment: therapy treatment to continue next visit.  Planned interventions for next visit: continue with current treatment.

## 2022-08-11 NOTE — OP THERAPY DAILY TREATMENT
Outpatient Physical Therapy  DAILY TREATMENT     [facility] Outpatient [facility] Therapy [address], [address]  [place] [address]  Phone:  [phone]    Date: 08/12/2022    Patient: Briseida Olivares  YOB: 1935  MRN: [id number]     Time Calculation    Start time: 0845  Stop time: 0930 Time Calculation (min): 45 minutes         Chief Complaint: Back Problem and Hip Problem    Visit #: 3    SUBJECTIVE:  The patient reports having periods of sciatic adown the (R) leg the last weeks    OBJECTIVE:  Current objective measures:       Re-assess (L) SIJ dysfunction; review re-alignment techniques through muscle energy     Therapeutic Exercises (CPT 52874):     1. posterior pelvic tilts / ab crunches , 10x     2. bridges , 10x     3. (L) hip flexion/ (L) hip extension , 5x for 5 seconds     4. piriformis stretch, 3 x30sec     5. clamshells    6. hip abduction    7. Nu step bike    8. bridges with add/abd, 2 x10 reps    Therapeutic Treatments and Modalities:     1. Manual Therapy (CPT 79599), (L) hip , contract relax isometric to (L) hip; (R) hip extension isometrics, (R) glute hip , IASTM to glute hip   Time-based treatments/modalities:Physical Therapy Timed Treatment Charges  Manual therapy minutes (CPT 81875): 15 minutes  Neuromusc re-ed, balance, coor, post minutes (CPT 32800): 15 minutes  Therapeutic exercise minutes (CPT 39041): 15 minutes    ASSESSMENT:   Response to treatment:   IASTM to the bilateral hip abductors; piriformis; tenderness to the (L) > (R) side; Patient reported less tenderness to the (R) and (L) sides following treatment. Educated patient on self care for trunk stabilization.    PLAN/RECOMMENDATIONS:   Plan for treatment: therapy treatment to continue next visit.  Planned interventions for next visit: continue with current treatment.

## 2022-08-17 NOTE — OP THERAPY DAILY TREATMENT
Outpatient Physical Therapy  DAILY TREATMENT     [facility] [place] [address] [facility], [address]  [place] [address]  Phone:  [phone]    Date: 08/17/2022    Patient: Briseida Olivares  YOB: 1935  MRN: [id number]     Time Calculation    Start time: 1030  Stop time: 1115 Time Calculation (min): 45 minutes         Chief Complaint: Back Problem    Visit #: 4    SUBJECTIVE:  The patient reports less frequency of episodes of sharp sudden pain down her (R) leg but typically occurs when she is trying to lift her (R) leg.    OBJECTIVE:  Current objective measures:       Decrease tightness to piriformis/ hamstring to (R) side     Therapeutic Exercises (CPT 45196):     1. posterior pelvic tilts / ab crunches , 10x     2. bridges , 10x     3. (L) hip flexion/ (L) hip extension , 5x for 5 seconds     4. piriformis stretch, 3 x30sec     5. clamshells, 2 x10 reps (green tb)    6. hip abduction, 2 x10 reps (green tb)    7. Nu step bike, seat 18 @ 9: minutes; 102 steps; 42 caloires    8. bridges with add/abd, 2 x10 reps (green tb)    Therapeutic Treatments and Modalities:     1. Manual Therapy (CPT 41934), (L) hip , contract relax isometric to (L) hip; (R) hip extension isometrics, (R) glute hip , IASTM to glute hip   Time-based treatments/modalities:    Physical Therapy Timed Treatment Charges  Manual therapy minutes (CPT 25090): 15 minutes  Neuromusc re-ed, balance, coor, post minutes (CPT 34656): 15 minutes  Therapeutic exercise minutes (CPT 99841): 15 minutes    ASSESSMENT:   Response to treatment:   IASTM to the (R) hip glute region; decreased tenderness; PROM with hip flexion; static stretches for (R) hamstring and quadriceps. Patient tolerated with good response of less tension       PLAN/RECOMMENDATIONS:   Plan for treatment: therapy treatment to continue next visit.  Planned interventions for next visit: continue with current treatment.

## 2022-09-14 PROBLEM — H61.20 CERUMEN IMPACTION: Status: ACTIVE | Noted: 2022-01-01

## 2022-09-14 NOTE — ASSESSMENT & PLAN NOTE
Mildly elevated blood pressure systolically in the office today.  At this time we will continue to follow no change in medications.

## 2022-09-14 NOTE — ASSESSMENT & PLAN NOTE
Her right TM is somewhat dull and opaque.  Suggesting there may be fluid behind it.  The right canal is impacted with wax, this was irrigated and then removed manually by myself.  She feels better on the right side.

## 2022-09-14 NOTE — ASSESSMENT & PLAN NOTE
Normal his lipid profile looks good.  I recommend she takes the rosuvastatin in the morning, since it seems it will help her remember to take it.  She thinks this is a good idea and will try to be more adherent..

## 2022-09-14 NOTE — PROGRESS NOTES
Subjective:     Chief Complaint   Patient presents with    Lab Results     Briseida Olivares is a 86 y.o. female here today for     Problem   Cerumen Impaction    Briseida states that her ears are bothering her.  She states they feel full and popping at times.  Especially bad when she is driving an airplane.  Her left shoulder is bothering her more than the right.     Prediabetes   Dyslipidemia    Briseida states she occasionally forgets to take her rosuvastatin.  But when she does take it she notices no side effects.     Hypertension    Briseida reports occasionally forgetting to take her losartan but has no side effects of medication.  She does not routinely monitor her blood pressure but it has not been elevated at other office visits.          Current medicines (including changes today)  Current Outpatient Medications   Medication Sig Dispense Refill    rosuvastatin (CRESTOR) 20 MG Tab Take 1 Tablet by mouth every evening. 100 Tablet 3    losartan (COZAAR) 50 MG Tab Take 1 Tablet by mouth every day. 100 Tablet 3    alendronate (FOSAMAX) 70 MG Tab Take 1 Tablet by mouth every 7 days. 14 Tablet 3    Multiple Vitamins-Minerals (PRESERVISION AREDS 2) Cap Take  by mouth every bedtime.      Coenzyme Q10-Vitamin E (COQ10 ST-100 PO) Take  by mouth every day.      vitamin D (CHOLECALCIFEROL) 1000 UNIT Tab Take 1,000 Units by mouth 2 Times a Day.      Biotin 92250 MCG Tab Take  by mouth every day.      Glucosamine-Chondroit-Vit C-Mn (GLUCOSAMINE CHONDR 1500 COMPLX) Cap Take  by mouth every day.      Calcium Carb-Cholecalciferol (CALCIUM 1000 + D PO) Take  by mouth 2 Times a Day.      cyclosporin (RESTASIS) 0.05 % ophthalmic emulsion Place 1 Drop in both eyes 2 times a day.      NON SPECIFIED Systane eye drops, daily       No current facility-administered medications for this visit.       Social History     Tobacco Use    Smoking status: Never    Smokeless tobacco: Never   Vaping Use    Vaping Use: Never used   Substance Use Topics     Alcohol use: Not Currently     Comment: 3 times a year    Drug use: Never     Past Medical History:   Diagnosis Date    Arthritis     all over    Bowel habit changes     constipation    Cancer (HCC)     DCIS    Dry cough     Heart burn     High cholesterol     Hypertension     Pain     arthritis, right shoulder bursitis    Urinary incontinence     wears a pad all the time      Family History   Problem Relation Age of Onset    Cancer Sister         breast    Cancer Paternal Aunt         breast    Cancer Paternal Aunt         breast    Cancer Paternal Aunt         breast    Cancer Paternal Aunt         breast    Cancer Paternal Aunt         breast         Objective:     Vitals:    09/14/22 1002   BP: (!) 147/72   BP Location: Right arm   Patient Position: Sitting   BP Cuff Size: Small adult   Pulse: 85   SpO2: 90%   Weight: 58.5 kg (129 lb)   Height: 1.524 m (5')     Body mass index is 25.19 kg/m².     Physical Exam:   Gen: Well developed, well nourished in no acute distress.   Skin: Pink, warm, and dry  HEENT: conjunctiva non-injected, sclera non-icteric. EOMs intact.   Nasal mucosa without edema nor erythema.   Pinna normal.    Oral mucous membranes pink and moist with no lesions.  Neck: Supple, trachea midline. No adenopathy or masses in the neck or supraclavicular regions.  Lungs: Effort is normal. Clear to auscultation bilaterally with good excursion.  CV: regular rate and rhythm, no murmurs  Abdomen: soft, nontender, + BS. No HSM.  No CVAT  Ext: no edema, color normal, vascularity normal, temperature normal  Alert and oriented Eye contact is good, speech goal directed, affect calm    Assessment and Plan:   Cerumen impaction  Her right TM is somewhat dull and opaque.  Suggesting there may be fluid behind it.  The right canal is impacted with wax, this was irrigated and then removed manually by myself.  She feels better on the right side.       Dyslipidemia  Normal his lipid profile looks good.  I recommend she  takes the rosuvastatin in the morning, since it seems it will help her remember to take it.  She thinks this is a good idea and will try to be more adherent..    Hypertension  Mildly elevated blood pressure systolically in the office today.  At this time we will continue to follow no change in medications.    Prediabetes  Briseida's had mildly elevated glucose and hemoglobin A1c for little while now.  We discussed diet and exercise.  I do not think any radical changes are needed at this time.    Followup: No follow-ups on file.    Sathish Pandya M.D.

## 2022-09-14 NOTE — ASSESSMENT & PLAN NOTE
Briseida's had mildly elevated glucose and hemoglobin A1c for little while now.  We discussed diet and exercise.  I do not think any radical changes are needed at this time.

## 2023-01-01 ENCOUNTER — HOME CARE VISIT (OUTPATIENT)
Dept: HOSPICE | Facility: HOSPICE | Age: 88
End: 2023-01-01
Payer: MEDICARE

## 2023-01-01 ENCOUNTER — HOSPITAL ENCOUNTER (OUTPATIENT)
Facility: MEDICAL CENTER | Age: 88
End: 2023-04-04
Attending: STUDENT IN AN ORGANIZED HEALTH CARE EDUCATION/TRAINING PROGRAM | Admitting: STUDENT IN AN ORGANIZED HEALTH CARE EDUCATION/TRAINING PROGRAM
Payer: MEDICARE

## 2023-01-01 ENCOUNTER — DOCUMENTATION (OUTPATIENT)
Dept: HEALTH INFORMATION MANAGEMENT | Facility: OTHER | Age: 88
End: 2023-01-01
Payer: MEDICARE

## 2023-01-01 ENCOUNTER — HOSPITAL ENCOUNTER (OUTPATIENT)
Dept: LAB | Facility: MEDICAL CENTER | Age: 88
End: 2023-03-24
Attending: STUDENT IN AN ORGANIZED HEALTH CARE EDUCATION/TRAINING PROGRAM
Payer: MEDICARE

## 2023-01-01 ENCOUNTER — PHARMACY VISIT (OUTPATIENT)
Dept: PHARMACY | Facility: MEDICAL CENTER | Age: 88
End: 2023-01-01
Payer: COMMERCIAL

## 2023-01-01 ENCOUNTER — TELEPHONE (OUTPATIENT)
Dept: HEALTH INFORMATION MANAGEMENT | Facility: OTHER | Age: 88
End: 2023-01-01
Payer: MEDICARE

## 2023-01-01 ENCOUNTER — APPOINTMENT (OUTPATIENT)
Dept: MEDICAL GROUP | Facility: CLINIC | Age: 88
End: 2023-01-01
Payer: MEDICARE

## 2023-01-01 ENCOUNTER — APPOINTMENT (OUTPATIENT)
Dept: ADMISSIONS | Facility: MEDICAL CENTER | Age: 88
End: 2023-01-01
Attending: STUDENT IN AN ORGANIZED HEALTH CARE EDUCATION/TRAINING PROGRAM
Payer: MEDICARE

## 2023-01-01 ENCOUNTER — HOSPITAL ENCOUNTER (OUTPATIENT)
Dept: HEMATOLOGY ONCOLOGY | Facility: MEDICAL CENTER | Age: 88
End: 2023-04-12
Attending: STUDENT IN AN ORGANIZED HEALTH CARE EDUCATION/TRAINING PROGRAM
Payer: MEDICARE

## 2023-01-01 ENCOUNTER — PRE-ADMISSION TESTING (OUTPATIENT)
Dept: ADMISSIONS | Facility: MEDICAL CENTER | Age: 88
End: 2023-01-01
Payer: MEDICARE

## 2023-01-01 ENCOUNTER — HOSPICE ADMISSION (OUTPATIENT)
Dept: HOSPICE | Facility: HOSPICE | Age: 88
End: 2023-01-01
Payer: MEDICARE

## 2023-01-01 ENCOUNTER — RESEARCH ENCOUNTER (OUTPATIENT)
Dept: RESEARCH | Facility: WORKSITE | Age: 88
End: 2023-01-01

## 2023-01-01 ENCOUNTER — OFFICE VISIT (OUTPATIENT)
Dept: MEDICAL GROUP | Facility: CLINIC | Age: 88
End: 2023-01-01
Payer: MEDICARE

## 2023-01-01 ENCOUNTER — NON-PROVIDER VISIT (OUTPATIENT)
Dept: GENETICS | Facility: MEDICAL CENTER | Age: 88
End: 2023-01-01
Payer: MEDICARE

## 2023-01-01 ENCOUNTER — APPOINTMENT (OUTPATIENT)
Dept: RADIOLOGY | Facility: MEDICAL CENTER | Age: 88
End: 2023-01-01
Attending: STUDENT IN AN ORGANIZED HEALTH CARE EDUCATION/TRAINING PROGRAM
Payer: MEDICARE

## 2023-01-01 ENCOUNTER — HOSPITAL ENCOUNTER (OUTPATIENT)
Dept: RADIOLOGY | Facility: MEDICAL CENTER | Age: 88
End: 2023-03-27
Attending: STUDENT IN AN ORGANIZED HEALTH CARE EDUCATION/TRAINING PROGRAM
Payer: MEDICARE

## 2023-01-01 ENCOUNTER — TELEPHONE (OUTPATIENT)
Dept: MEDICAL GROUP | Facility: CLINIC | Age: 88
End: 2023-01-01
Payer: MEDICARE

## 2023-01-01 ENCOUNTER — APPOINTMENT (OUTPATIENT)
Dept: RADIOLOGY | Facility: MEDICAL CENTER | Age: 88
End: 2023-01-01
Attending: RADIOLOGY
Payer: MEDICARE

## 2023-01-01 VITALS — RESPIRATION RATE: 20 BRPM | HEART RATE: 110 BPM

## 2023-01-01 VITALS
OXYGEN SATURATION: 93 % | WEIGHT: 118.61 LBS | SYSTOLIC BLOOD PRESSURE: 150 MMHG | RESPIRATION RATE: 16 BRPM | DIASTOLIC BLOOD PRESSURE: 67 MMHG | BODY MASS INDEX: 22.39 KG/M2 | TEMPERATURE: 97.5 F | HEART RATE: 74 BPM | HEIGHT: 61 IN

## 2023-01-01 VITALS
BODY MASS INDEX: 22.47 KG/M2 | WEIGHT: 119 LBS | HEART RATE: 106 BPM | SYSTOLIC BLOOD PRESSURE: 104 MMHG | RESPIRATION RATE: 16 BRPM | TEMPERATURE: 98.6 F | DIASTOLIC BLOOD PRESSURE: 70 MMHG | HEIGHT: 61 IN | OXYGEN SATURATION: 94 %

## 2023-01-01 VITALS — DIASTOLIC BLOOD PRESSURE: 80 MMHG | RESPIRATION RATE: 22 BRPM | SYSTOLIC BLOOD PRESSURE: 137 MMHG | HEART RATE: 100 BPM

## 2023-01-01 VITALS — RESPIRATION RATE: 18 BRPM | SYSTOLIC BLOOD PRESSURE: 150 MMHG | HEART RATE: 86 BPM | DIASTOLIC BLOOD PRESSURE: 76 MMHG

## 2023-01-01 VITALS
WEIGHT: 112 LBS | RESPIRATION RATE: 17 BRPM | HEART RATE: 98 BPM | BODY MASS INDEX: 21.87 KG/M2 | DIASTOLIC BLOOD PRESSURE: 76 MMHG | SYSTOLIC BLOOD PRESSURE: 138 MMHG

## 2023-01-01 VITALS — HEART RATE: 112 BPM | DIASTOLIC BLOOD PRESSURE: 61 MMHG | RESPIRATION RATE: 16 BRPM | SYSTOLIC BLOOD PRESSURE: 132 MMHG

## 2023-01-01 VITALS — RESPIRATION RATE: 20 BRPM | HEART RATE: 80 BPM | DIASTOLIC BLOOD PRESSURE: 58 MMHG | SYSTOLIC BLOOD PRESSURE: 110 MMHG

## 2023-01-01 VITALS — DIASTOLIC BLOOD PRESSURE: 60 MMHG | SYSTOLIC BLOOD PRESSURE: 120 MMHG | HEART RATE: 80 BPM

## 2023-01-01 VITALS — HEART RATE: 82 BPM | SYSTOLIC BLOOD PRESSURE: 160 MMHG | RESPIRATION RATE: 16 BRPM | DIASTOLIC BLOOD PRESSURE: 60 MMHG

## 2023-01-01 VITALS
HEART RATE: 105 BPM | SYSTOLIC BLOOD PRESSURE: 120 MMHG | HEIGHT: 60 IN | DIASTOLIC BLOOD PRESSURE: 50 MMHG | BODY MASS INDEX: 23.36 KG/M2 | OXYGEN SATURATION: 93 % | WEIGHT: 119 LBS | TEMPERATURE: 99 F

## 2023-01-01 VITALS — RESPIRATION RATE: 20 BRPM | HEART RATE: 96 BPM

## 2023-01-01 VITALS — DIASTOLIC BLOOD PRESSURE: 70 MMHG | SYSTOLIC BLOOD PRESSURE: 136 MMHG | HEART RATE: 80 BPM

## 2023-01-01 VITALS — RESPIRATION RATE: 18 BRPM | HEART RATE: 80 BPM

## 2023-01-01 VITALS — RESPIRATION RATE: 20 BRPM | HEART RATE: 98 BPM

## 2023-01-01 DIAGNOSIS — D05.11 DUCTAL CARCINOMA IN SITU (DCIS) OF RIGHT BREAST: ICD-10-CM

## 2023-01-01 DIAGNOSIS — C25.2 MALIGNANT NEOPLASM OF TAIL OF PANCREAS (HCC): ICD-10-CM

## 2023-01-01 DIAGNOSIS — R10.13 EPIGASTRIC ABDOMINAL PAIN: ICD-10-CM

## 2023-01-01 DIAGNOSIS — C78.7 LIVER METASTASES: ICD-10-CM

## 2023-01-01 DIAGNOSIS — K59.00 CONSTIPATION, UNSPECIFIED CONSTIPATION TYPE: ICD-10-CM

## 2023-01-01 DIAGNOSIS — R63.4 WEIGHT LOSS: ICD-10-CM

## 2023-01-01 DIAGNOSIS — Z51.5 HOSPICE CARE: ICD-10-CM

## 2023-01-01 DIAGNOSIS — Z01.818 PRE-OP TESTING: ICD-10-CM

## 2023-01-01 DIAGNOSIS — R06.00 DYSPNEA, UNSPECIFIED TYPE: ICD-10-CM

## 2023-01-01 DIAGNOSIS — C79.9 METASTATIC ADENOCARCINOMA (HCC): ICD-10-CM

## 2023-01-01 DIAGNOSIS — C25.9 MALIGNANT NEOPLASM OF PANCREAS, UNSPECIFIED LOCATION OF MALIGNANCY (HCC): ICD-10-CM

## 2023-01-01 DIAGNOSIS — C25.2 MALIGNANT NEOPLASM OF TAIL OF PANCREAS (HCC): Primary | ICD-10-CM

## 2023-01-01 DIAGNOSIS — R68.81 EARLY SATIETY: ICD-10-CM

## 2023-01-01 DIAGNOSIS — R52 PAIN ASSOCIATED WITH TERMINAL ILLNESS: ICD-10-CM

## 2023-01-01 DIAGNOSIS — Z01.812 PRE-OPERATIVE LABORATORY EXAMINATION: ICD-10-CM

## 2023-01-01 LAB
ALBUMIN SERPL BCP-MCNC: 4.2 G/DL (ref 3.2–4.9)
ALBUMIN/GLOB SERPL: 1.1 G/DL
ALP SERPL-CCNC: 107 U/L (ref 30–99)
ALT SERPL-CCNC: 14 U/L (ref 2–50)
ANION GAP SERPL CALC-SCNC: 12 MMOL/L (ref 7–16)
AST SERPL-CCNC: 17 U/L (ref 12–45)
BASOPHILS # BLD AUTO: 0.6 % (ref 0–1.8)
BASOPHILS # BLD: 0.06 K/UL (ref 0–0.12)
BILIRUB SERPL-MCNC: 0.9 MG/DL (ref 0.1–1.5)
BUN SERPL-MCNC: 21 MG/DL (ref 8–22)
CALCIUM ALBUM COR SERPL-MCNC: 9.2 MG/DL (ref 8.5–10.5)
CALCIUM SERPL-MCNC: 9.4 MG/DL (ref 8.5–10.5)
CHLORIDE SERPL-SCNC: 103 MMOL/L (ref 96–112)
CO2 SERPL-SCNC: 22 MMOL/L (ref 20–33)
CREAT SERPL-MCNC: 0.97 MG/DL (ref 0.5–1.4)
EOSINOPHIL # BLD AUTO: 0.08 K/UL (ref 0–0.51)
EOSINOPHIL NFR BLD: 0.8 % (ref 0–6.9)
ERYTHROCYTE [DISTWIDTH] IN BLOOD BY AUTOMATED COUNT: 43.3 FL (ref 35.9–50)
GFR SERPLBLD CREATININE-BSD FMLA CKD-EPI: 56 ML/MIN/1.73 M 2
GLOBULIN SER CALC-MCNC: 3.7 G/DL (ref 1.9–3.5)
GLUCOSE SERPL-MCNC: 109 MG/DL (ref 65–99)
HCT VFR BLD AUTO: 44.1 % (ref 37–47)
HGB BLD-MCNC: 14.3 G/DL (ref 12–16)
IMM GRANULOCYTES # BLD AUTO: 0.03 K/UL (ref 0–0.11)
IMM GRANULOCYTES NFR BLD AUTO: 0.3 % (ref 0–0.9)
INR PPP: 1.2 (ref 0.87–1.13)
LIPASE SERPL-CCNC: 49 U/L (ref 11–82)
LYMPHOCYTES # BLD AUTO: 1.73 K/UL (ref 1–4.8)
LYMPHOCYTES NFR BLD: 17.3 % (ref 22–41)
MCH RBC QN AUTO: 30.6 PG (ref 27–33)
MCHC RBC AUTO-ENTMCNC: 32.4 G/DL (ref 33.6–35)
MCV RBC AUTO: 94.4 FL (ref 81.4–97.8)
MONOCYTES # BLD AUTO: 1.08 K/UL (ref 0–0.85)
MONOCYTES NFR BLD AUTO: 10.8 % (ref 0–13.4)
NEUTROPHILS # BLD AUTO: 7.01 K/UL (ref 2–7.15)
NEUTROPHILS NFR BLD: 70.2 % (ref 44–72)
NRBC # BLD AUTO: 0 K/UL
NRBC BLD-RTO: 0 /100 WBC
PATHOLOGY CONSULT NOTE: NORMAL
PLATELET # BLD AUTO: 321 K/UL (ref 164–446)
PMV BLD AUTO: 10.3 FL (ref 9–12.9)
POTASSIUM SERPL-SCNC: 4.3 MMOL/L (ref 3.6–5.5)
PROT SERPL-MCNC: 7.9 G/DL (ref 6–8.2)
PROTHROMBIN TIME: 15 SEC (ref 12–14.6)
RBC # BLD AUTO: 4.67 M/UL (ref 4.2–5.4)
SODIUM SERPL-SCNC: 137 MMOL/L (ref 135–145)
WBC # BLD AUTO: 10 K/UL (ref 4.8–10.8)

## 2023-01-01 PROCEDURE — S9126 HOSPICE CARE, IN THE HOME, P: HCPCS

## 2023-01-01 PROCEDURE — 85610 PROTHROMBIN TIME: CPT

## 2023-01-01 PROCEDURE — 700111 HCHG RX REV CODE 636 W/ 250 OVERRIDE (IP): Performed by: RADIOLOGY

## 2023-01-01 PROCEDURE — 700117 HCHG RX CONTRAST REV CODE 255: Performed by: STUDENT IN AN ORGANIZED HEALTH CARE EDUCATION/TRAINING PROGRAM

## 2023-01-01 PROCEDURE — 160002 HCHG RECOVERY MINUTES (STAT)

## 2023-01-01 PROCEDURE — G0299 HHS/HOSPICE OF RN EA 15 MIN: HCPCS

## 2023-01-01 PROCEDURE — 88342 IMHCHEM/IMCYTCHM 1ST ANTB: CPT

## 2023-01-01 PROCEDURE — 36415 COLL VENOUS BLD VENIPUNCTURE: CPT

## 2023-01-01 PROCEDURE — G0155 HHCP-SVS OF CSW,EA 15 MIN: HCPCS

## 2023-01-01 PROCEDURE — 99212 OFFICE O/P EST SF 10 MIN: CPT | Performed by: STUDENT IN AN ORGANIZED HEALTH CARE EDUCATION/TRAINING PROGRAM

## 2023-01-01 PROCEDURE — 700105 HCHG RX REV CODE 258: Performed by: RADIOLOGY

## 2023-01-01 PROCEDURE — 83690 ASSAY OF LIPASE: CPT

## 2023-01-01 PROCEDURE — 77012 CT SCAN FOR NEEDLE BIOPSY: CPT

## 2023-01-01 PROCEDURE — 160036 HCHG PACU - EA ADDL 30 MINS PHASE I

## 2023-01-01 PROCEDURE — 88333 PATH CONSLTJ SURG CYTO XM 1: CPT

## 2023-01-01 PROCEDURE — 160046 HCHG PACU - 1ST 60 MINS PHASE II

## 2023-01-01 PROCEDURE — 88341 IMHCHEM/IMCYTCHM EA ADD ANTB: CPT | Mod: 91

## 2023-01-01 PROCEDURE — 700111 HCHG RX REV CODE 636 W/ 250 OVERRIDE (IP)

## 2023-01-01 PROCEDURE — 71045 X-RAY EXAM CHEST 1 VIEW: CPT

## 2023-01-01 PROCEDURE — 99213 OFFICE O/P EST LOW 20 MIN: CPT | Mod: GE | Performed by: STUDENT IN AN ORGANIZED HEALTH CARE EDUCATION/TRAINING PROGRAM

## 2023-01-01 PROCEDURE — 80053 COMPREHEN METABOLIC PANEL: CPT

## 2023-01-01 PROCEDURE — 85025 COMPLETE CBC W/AUTO DIFF WBC: CPT

## 2023-01-01 PROCEDURE — 160035 HCHG PACU - 1ST 60 MINS PHASE I

## 2023-01-01 PROCEDURE — 88307 TISSUE EXAM BY PATHOLOGIST: CPT

## 2023-01-01 PROCEDURE — 99205 OFFICE O/P NEW HI 60 MIN: CPT | Performed by: STUDENT IN AN ORGANIZED HEALTH CARE EDUCATION/TRAINING PROGRAM

## 2023-01-01 PROCEDURE — RXMED WILLOW AMBULATORY MEDICATION CHARGE: Performed by: REHABILITATION PRACTITIONER

## 2023-01-01 PROCEDURE — 74177 CT ABD & PELVIS W/CONTRAST: CPT

## 2023-01-01 PROCEDURE — 665036 HSPC NOTICE OF ELECTION NOE

## 2023-01-01 RX ORDER — ONDANSETRON 4 MG/1
4 TABLET, ORALLY DISINTEGRATING ORAL EVERY 6 HOURS PRN
Qty: 30 TABLET | Refills: 10 | Status: SHIPPED | OUTPATIENT
Start: 2023-01-01 | End: 2024-04-17

## 2023-01-01 RX ORDER — POLYETHYLENE GLYCOL 3350 17 G/17G
17 POWDER, FOR SOLUTION ORAL DAILY
Qty: 225 G | Refills: 0 | Status: SHIPPED
Start: 2023-01-01 | End: 2023-01-01

## 2023-01-01 RX ORDER — ALENDRONATE SODIUM 70 MG/1
70 TABLET ORAL
COMMUNITY
End: 2023-01-01

## 2023-01-01 RX ORDER — ALENDRONATE SODIUM 70 MG/1
70 TABLET ORAL
Qty: 12 TABLET | Refills: 0 | Status: SHIPPED
Start: 2023-01-01 | End: 2023-01-01

## 2023-01-01 RX ORDER — ROSUVASTATIN CALCIUM 20 MG/1
20 TABLET, COATED ORAL
COMMUNITY
End: 2023-01-01

## 2023-01-01 RX ORDER — ONDANSETRON 2 MG/ML
4 INJECTION INTRAMUSCULAR; INTRAVENOUS PRN
Status: DISCONTINUED | OUTPATIENT
Start: 2023-01-01 | End: 2023-01-01 | Stop reason: HOSPADM

## 2023-01-01 RX ORDER — SODIUM CHLORIDE 9 MG/ML
1000 INJECTION, SOLUTION INTRAVENOUS
Status: COMPLETED | OUTPATIENT
Start: 2023-01-01 | End: 2023-01-01

## 2023-01-01 RX ORDER — OXYCODONE HYDROCHLORIDE 5 MG/1
10 TABLET ORAL
Status: DISCONTINUED | OUTPATIENT
Start: 2023-01-01 | End: 2023-01-01 | Stop reason: HOSPADM

## 2023-01-01 RX ORDER — OMEPRAZOLE 20 MG/1
20 CAPSULE, DELAYED RELEASE ORAL DAILY
Qty: 14 CAPSULE | Refills: 10 | Status: SHIPPED | OUTPATIENT
Start: 2023-01-01 | End: 2023-01-01

## 2023-01-01 RX ORDER — OXYCODONE HYDROCHLORIDE 5 MG/1
5 TABLET ORAL
Status: DISCONTINUED | OUTPATIENT
Start: 2023-01-01 | End: 2023-01-01 | Stop reason: HOSPADM

## 2023-01-01 RX ORDER — LORAZEPAM 2 MG/ML
0.5 CONCENTRATE ORAL
Qty: 360 ML | Refills: 0 | Status: SHIPPED | OUTPATIENT
Start: 2023-01-01 | End: 2023-01-01 | Stop reason: DRUGHIGH

## 2023-01-01 RX ORDER — MIDAZOLAM HYDROCHLORIDE 1 MG/ML
.5-2 INJECTION INTRAMUSCULAR; INTRAVENOUS PRN
Status: DISCONTINUED | OUTPATIENT
Start: 2023-01-01 | End: 2023-01-01 | Stop reason: HOSPADM

## 2023-01-01 RX ORDER — OXYCODONE HYDROCHLORIDE 100 MG/5ML
10-20 SOLUTION ORAL
Qty: 240 ML | Refills: 0 | Status: SHIPPED | OUTPATIENT
Start: 2023-01-01 | End: 2023-06-21 | Stop reason: CLARIF

## 2023-01-01 RX ORDER — AMOXICILLIN 250 MG
2 CAPSULE ORAL 2 TIMES DAILY PRN
Qty: 28 TABLET | Refills: 10 | Status: SHIPPED | OUTPATIENT
Start: 2023-01-01 | End: 2024-04-17

## 2023-01-01 RX ORDER — MORPHINE SULFATE 100 MG/5ML
5 SOLUTION ORAL
Qty: 120 ML | Refills: 0 | Status: SHIPPED | OUTPATIENT
Start: 2023-01-01 | End: 2023-01-01

## 2023-01-01 RX ORDER — MIDAZOLAM HYDROCHLORIDE 1 MG/ML
INJECTION INTRAMUSCULAR; INTRAVENOUS
Status: COMPLETED
Start: 2023-01-01 | End: 2023-01-01

## 2023-01-01 RX ORDER — LORAZEPAM 2 MG/ML
1-2 CONCENTRATE ORAL
Qty: 360 ML | Refills: 0 | Status: SHIPPED | OUTPATIENT
Start: 2023-01-01 | End: 2023-06-21 | Stop reason: CLARIF

## 2023-01-01 RX ORDER — IBUPROFEN 200 MG
600 TABLET ORAL EVERY 6 HOURS PRN
Qty: 112 TABLET | Refills: 10 | Status: SHIPPED | OUTPATIENT
Start: 2023-01-01 | End: 2023-01-01

## 2023-01-01 RX ORDER — BISACODYL 10 MG
10 SUPPOSITORY, RECTAL RECTAL PRN
Qty: 5 SUPPOSITORY | Refills: 10 | OUTPATIENT
Start: 2023-01-01 | End: 2024-04-23

## 2023-01-01 RX ORDER — SODIUM CHLORIDE 9 MG/ML
500 INJECTION, SOLUTION INTRAVENOUS
Status: DISCONTINUED | OUTPATIENT
Start: 2023-01-01 | End: 2023-01-01 | Stop reason: HOSPADM

## 2023-01-01 RX ORDER — ACETAMINOPHEN 500 MG
1000 TABLET ORAL 3 TIMES DAILY
Qty: 84 TABLET | Refills: 10 | Status: SHIPPED | OUTPATIENT
Start: 2023-01-01 | End: 2023-01-01

## 2023-01-01 RX ORDER — HYDROMORPHONE HYDROCHLORIDE 1 MG/ML
0.5 INJECTION, SOLUTION INTRAMUSCULAR; INTRAVENOUS; SUBCUTANEOUS
Status: DISCONTINUED | OUTPATIENT
Start: 2023-01-01 | End: 2023-01-01 | Stop reason: HOSPADM

## 2023-01-01 RX ORDER — ONDANSETRON 2 MG/ML
4 INJECTION INTRAMUSCULAR; INTRAVENOUS EVERY 8 HOURS PRN
Status: DISCONTINUED | OUTPATIENT
Start: 2023-01-01 | End: 2023-01-01 | Stop reason: HOSPADM

## 2023-01-01 RX ORDER — OXYCODONE HYDROCHLORIDE 100 MG/5ML
2.5-5 SOLUTION ORAL
Qty: 240 ML | Refills: 0 | Status: SHIPPED | OUTPATIENT
Start: 2023-01-01 | End: 2023-01-01 | Stop reason: DRUGHIGH

## 2023-01-01 RX ORDER — BISACODYL 10 MG
10 SUPPOSITORY, RECTAL RECTAL PRN
Qty: 5 SUPPOSITORY | Refills: 10 | Status: SHIPPED
Start: 2023-01-01 | End: 2023-01-01 | Stop reason: SDUPTHER

## 2023-01-01 RX ORDER — DEXAMETHASONE 2 MG/1
2 TABLET ORAL 2 TIMES DAILY
Qty: 20 TABLET | Refills: 0 | Status: SHIPPED | OUTPATIENT
Start: 2023-01-01 | End: 2023-01-01 | Stop reason: SDUPTHER

## 2023-01-01 RX ORDER — LIDOCAINE HYDROCHLORIDE 10 MG/ML
INJECTION, SOLUTION EPIDURAL; INFILTRATION; INTRACAUDAL; PERINEURAL
Status: DISCONTINUED
Start: 2023-01-01 | End: 2023-01-01 | Stop reason: HOSPADM

## 2023-01-01 RX ADMIN — FENTANYL CITRATE 25 MCG: 50 INJECTION, SOLUTION INTRAMUSCULAR; INTRAVENOUS at 10:01

## 2023-01-01 RX ADMIN — SODIUM CHLORIDE 1000 ML: 9 INJECTION, SOLUTION INTRAVENOUS at 08:51

## 2023-01-01 RX ADMIN — IOHEXOL 20 ML: 240 INJECTION, SOLUTION INTRATHECAL; INTRAVASCULAR; INTRAVENOUS; ORAL at 17:02

## 2023-01-01 RX ADMIN — MIDAZOLAM HYDROCHLORIDE 0.5 MG: 1 INJECTION, SOLUTION INTRAMUSCULAR; INTRAVENOUS at 10:01

## 2023-01-01 RX ADMIN — IOHEXOL 100 ML: 350 INJECTION, SOLUTION INTRAVENOUS at 17:02

## 2023-01-01 RX ADMIN — MIDAZOLAM HYDROCHLORIDE 0.5 MG: 1 INJECTION, SOLUTION INTRAMUSCULAR; INTRAVENOUS at 10:05

## 2023-01-01 SDOH — ECONOMIC STABILITY: GENERAL

## 2023-01-01 ASSESSMENT — ENCOUNTER SYMPTOMS
FATIGUES EASILY: 1
PAIN: 1
NAUSEA: 1
LAST BOWEL MOVEMENT: 66580
STOOL FREQUENCY: LESS THAN DAILY
PAIN SEVERITY GOAL: 2/10
SHORTNESS OF BREATH: 1
HIGHEST PAIN SEVERITY IN PAST 24 HOURS: 4/10
SUBJECTIVE PAIN PROGRESSION: UNCHANGED
PAIN LOCATION - PAIN QUALITY: PRESSURE
FATIGUES EASILY: 1
PAIN SEVERITY GOAL: 3/10
COUGH: 0
PAIN LOCATION - PAIN SEVERITY: 2/10
LOWEST PAIN SEVERITY IN PAST 24 HOURS: 2/10
CONSTIPATION: 0
HEADACHES: 0
NAUSEA: 1
VOMITING: 0
PAIN SEVERITY GOAL: 2/10
PAIN LOCATION - PAIN SEVERITY: 2/10
CHILLS: 0
NAUSEA: 1
PAIN LOCATION - PAIN QUALITY: ACHY
SLEEP QUALITY: FAIR
FATIGUES EASILY: 1
PAIN LOCATION: ABDOMEN
PAIN LOCATION: ABDOMINAL PAIN
PAIN LOCATION - PAIN FREQUENCY: INTERMITTENT
SLEEP QUALITY: FAIR
LAST BOWEL MOVEMENT: 66588
NAUSEA: 1
PALPITATIONS: 1
SUBJECTIVE PAIN PROGRESSION: UNCHANGED
LAST BOWEL MOVEMENT: 66640
ABDOMINAL PAIN: 1
SINUS PAIN: 0
PERSON REPORTING PAIN: PATIENT
STOOL FREQUENCY: LESS THAN DAILY
PERSON REPORTING PAIN: PATIENT
LAST BOWEL MOVEMENT: 66604
SLEEP QUALITY: FAIR
PAIN LOCATION - PAIN DURATION: AM
PAIN LOCATION - PAIN FREQUENCY: CONSTANT
LAST BOWEL MOVEMENT: 66638
FATIGUES EASILY: 1
HIGHEST PAIN SEVERITY IN PAST 24 HOURS: 3/10
WEAKNESS: 0
SLEEP QUALITY: FAIR
PAIN LOCATION: ABDOMIN
PAIN: 1
LAST BOWEL MOVEMENT: 66611
PAIN LOCATION: UPPER ABD
MYALGIAS: 0
SLEEP QUALITY: FAIR
SUBJECTIVE PAIN PROGRESSION: GRADUALLY WORSENING
PAIN: 1
PAIN LOCATION - PAIN SEVERITY: 5/10
STOOL FREQUENCY: LESS THAN DAILY
HIGHEST PAIN SEVERITY IN PAST 24 HOURS: 3/10
LAST BOWEL MOVEMENT: 66588
STOOL FREQUENCY: LESS THAN DAILY
LOWEST PAIN SEVERITY IN PAST 24 HOURS: 2/10
LAST BOWEL MOVEMENT: 66618
FATIGUES EASILY: 1
UNABLE TO COMMUNICATE PAIN: 1
ONSET QUALITY: INTERMITTENT
LOWEST PAIN SEVERITY IN PAST 24 HOURS: 3/10
PAIN SEVERITY GOAL: 3/10
LOWEST PAIN SEVERITY IN PAST 24 HOURS: 2/10
SLEEP QUALITY: FAIR
PALPITATIONS: 0
PAIN LOCATION - RELIEVING FACTORS: TYLENOL/MOTRIN
LAST BOWEL MOVEMENT: 66618
PAIN SEVERITY GOAL: 4/10
FATIGUES EASILY: 1
HIGHEST PAIN SEVERITY IN PAST 24 HOURS: 5/10
LOWER EXTREMITY EDEMA: 1
PAIN LOCATION - PAIN SEVERITY: 4/10
BLURRED VISION: 0
FATIGUES EASILY: 1
FATIGUES EASILY: 1
PAIN LOCATION - PAIN FREQUENCY: INTERMITTENT
SUBJECTIVE PAIN PROGRESSION: RESOLVED
LAST BOWEL MOVEMENT: 66640
FATIGUE: 1
PAIN LOCATION - RELIEVING FACTORS: TYLENOL AND IBUPROFEN
PAIN LOCATION - PAIN QUALITY: DULL
STOOL FREQUENCY: LESS THAN DAILY
PAIN LOCATION - RELIEVING FACTORS: HAVING A BM
STOOL FREQUENCY: LESS THAN DAILY
HIGHEST PAIN SEVERITY IN PAST 24 HOURS: 4/10
STOOL FREQUENCY: LESS THAN DAILY
FATIGUES EASILY: 1
ABDOMINAL PAIN: 1
FEVER: 0
NAUSEA: 1
ABDOMINAL PAIN: 1
PAIN LOCATION - PAIN SEVERITY: 3/10
PAIN LOCATION - PAIN QUALITY: DULL
PAIN LOCATION - PAIN QUALITY: DULL
HIGHEST PAIN SEVERITY IN PAST 24 HOURS: 5/10
LOWEST PAIN SEVERITY IN PAST 24 HOURS: 1/10
PALPITATIONS: 1
ORTHOPNEA: 0
LOWEST PAIN SEVERITY IN PAST 24 HOURS: 2/10
DIZZINESS: 0
ONSET QUALITY: INTERMITTENT
WEIGHT LOSS: 1
BACK PAIN: 1
STOOL FREQUENCY: LESS THAN DAILY
STOOL FREQUENCY: LESS THAN DAILY
HIGHEST PAIN SEVERITY IN PAST 24 HOURS: 3/10
SUBJECTIVE PAIN PROGRESSION: UNCHANGED
LAST BOWEL MOVEMENT: 66632
BRUISES/BLEEDS EASILY: 0
INSOMNIA: 0
PAIN SEVERITY GOAL: 4/10
PAIN LOCATION: ABDOMIAL PAIN
PAIN LOCATION - PAIN FREQUENCY: INTERMITTENT
HEARTBURN: 0
FATIGUES EASILY: 1
SLEEP QUALITY: FAIR
ONSET QUALITY: INTERMITTENT
DOUBLE VISION: 0
PAIN: 1
STOOL FREQUENCY: LESS THAN DAILY
FATIGUE: 1
SLEEP QUALITY: FAIR
PALPITATIONS: 1
NAUSEA: 0
SUBJECTIVE PAIN PROGRESSION: UNCHANGED
PERSON REPORTING PAIN: PATIENT
FATIGUES EASILY: 1
PAIN: 1
SLEEP QUALITY: FAIR
PAIN SEVERITY GOAL: 3/10
SLEEP QUALITY: FAIR
SORE THROAT: 0
BLOOD IN STOOL: 0
DIAPHORESIS: 0
TINGLING: 0
SLEEP QUALITY: FAIR
SPUTUM PRODUCTION: 0
LOWEST PAIN SEVERITY IN PAST 24 HOURS: 2/10
DIARRHEA: 0
WHEEZING: 0
PAIN: 1
NERVOUS/ANXIOUS: 0

## 2023-01-01 ASSESSMENT — PAIN SCALES - PAIN ASSESSMENT IN ADVANCED DEMENTIA (PAINAD)
NEGVOCALIZATION: 1 - OCCASIONAL MOAN OR GROAN. LOW-LEVEL SPEECH WITH A NEGATIVE OR DISAPPROVING QUALITY.
TOTALSCORE: 4
NEGVOCALIZATION: 0 - NONE.
FACIALEXPRESSION: 2 - FACIAL GRIMACING.
CONSOLABILITY: 1 - DISTRACTED OR REASSURED BY VOICE OR TOUCH.
BODYLANGUAGE: 0 - RELAXED.
TOTALSCORE: 0
BODYLANGUAGE: 0 - RELAXED.
FACIALEXPRESSION: 0 - SMILING OR INEXPRESSIVE.
CONSOLABILITY: 0 - NO NEED TO CONSOLE.

## 2023-01-01 ASSESSMENT — ACTIVITIES OF DAILY LIVING (ADL)
MONEY MANAGEMENT (EXPENSES/BILLS): INDEPENDENT
LAUNDRY_REQUIRES_ASSISTANCE: 1
MONEY MANAGEMENT (EXPENSES/BILLS): INDEPENDENT
SHOPPING_REQUIRES_ASSISTANCE: 1
MONEY MANAGEMENT (EXPENSES/BILLS): INDEPENDENT

## 2023-01-01 ASSESSMENT — SOCIAL DETERMINANTS OF HEALTH (SDOH)
ACTIVE STRESSOR - HEALTH CHANGES: 1

## 2023-01-01 ASSESSMENT — PATIENT HEALTH QUESTIONNAIRE - PHQ9
CLINICAL INTERPRETATION OF PHQ2 SCORE: 0

## 2023-01-01 ASSESSMENT — FIBROSIS 4 INDEX
FIB4 SCORE: 1.23

## 2023-01-01 ASSESSMENT — PAIN SCALES - GENERAL: PAINLEVEL: 6=MODERATE PAIN

## 2023-01-01 ASSESSMENT — PAIN DESCRIPTION - PAIN TYPE: TYPE: SURGICAL PAIN

## 2023-03-24 PROBLEM — R68.81 EARLY SATIETY: Status: ACTIVE | Noted: 2023-01-01

## 2023-03-24 PROBLEM — K59.00 CONSTIPATION: Status: ACTIVE | Noted: 2023-01-01

## 2023-03-24 PROBLEM — R10.13 EPIGASTRIC ABDOMINAL PAIN: Status: ACTIVE | Noted: 2023-01-01

## 2023-03-24 PROBLEM — R63.4 WEIGHT LOSS: Status: ACTIVE | Noted: 2023-01-01

## 2023-03-24 NOTE — PROGRESS NOTES
Subjective:     CC: abdominal pain    HPI:   Briseida presents today with 1 week history of epigastric abdominal pain. She has had associated early satiety and weight loss for the past month. No nausea, vomiting or heartburn. She does have constipation. No diarrhea. No blood in stool. Not made worse with eating. No chest pain or trouble breathing.     Problem   Epigastric Abdominal Pain   Constipation   Weight Loss   Early Satiety       Current Outpatient Medications Ordered in Epic   Medication Sig Dispense Refill    polyethylene glycol 3350 (MIRALAX) 17 GM/SCOOP Powder Take 17 g by mouth every day. 225 g 0    rosuvastatin (CRESTOR) 20 MG Tab Take 1 Tablet by mouth every evening. 100 Tablet 3    losartan (COZAAR) 50 MG Tab Take 1 Tablet by mouth every day. 100 Tablet 3    alendronate (FOSAMAX) 70 MG Tab Take 1 Tablet by mouth every 7 days. 14 Tablet 3    Multiple Vitamins-Minerals (PRESERVISION AREDS 2) Cap Take  by mouth every bedtime.      Coenzyme Q10-Vitamin E (COQ10 ST-100 PO) Take  by mouth every day.      vitamin D (CHOLECALCIFEROL) 1000 UNIT Tab Take 1,000 Units by mouth 2 Times a Day.      Biotin 09076 MCG Tab Take  by mouth every day.      Glucosamine-Chondroit-Vit C-Mn (GLUCOSAMINE CHONDR 1500 COMPLX) Cap Take  by mouth every day.      Calcium Carb-Cholecalciferol (CALCIUM 1000 + D PO) Take  by mouth 2 Times a Day.      cyclosporin (RESTASIS) 0.05 % ophthalmic emulsion Place 1 Drop in both eyes 2 times a day.      NON SPECIFIED Systane eye drops, daily       No current [facility]-ordered facility-administered medications on file.       ROS:  Gen: no fevers, no chills  Eyes: no vision changes  ENT: no sore throat, no bloody nose  Pulm: no sob, no cough  CV: no chest pain, no palpitations  GI: no vomiting, no diarrhea  : no urinary changes  Skin: no rash  Neuro: no headaches, no numbness      Objective:     Exam:  /70 (BP Location: Left arm, Patient Position: Sitting, BP Cuff Size: Adult)   Pulse (!) 106   " Temp 37 °C (98.6 °F) (Temporal)   Resp 16   Ht 1.549 m (5' 1\")   Wt 54 kg (119 lb)   SpO2 94%   BMI 22.48 kg/m²  Body mass index is 22.48 kg/m².    Gen: Alert and oriented, No apparent distress.   Neck: Full range of motion, no lymphadenopathy  Lungs: Normal effort, no wheezing or rales  CV: Regular rate and rhythm. No murmurs  Ext: Moving all extremities, 2+ radial pulses bilaterally  Abd: soft, nontender, nondistended, no masses noted, no rashes noted    Assessment & Plan:     87 y.o. female with the following -     Problem List Items Addressed This Visit       Epigastric abdominal pain    Relevant Orders    CT-ABDOMEN-PELVIS W/O    Comp Metabolic Panel    LIPASE    CBC WITH DIFFERENTIAL    Referral to Gastroenterology    Constipation    Relevant Medications    polyethylene glycol 3350 (MIRALAX) 17 GM/SCOOP Powder    Weight loss    Relevant Orders    Referral to Gastroenterology    Early satiety    Relevant Orders    Referral to Gastroenterology       Epigastric abdominal pain with associated early satiety and weight loss. Per chart review, 10 lbs in 6 months. Patient is constipated and this could definitely be contributing. We will treat the constipation and evaluate for more concerning causes.   - CBC, CMP, lipase, CT abdomen pelvis, GI referral  "

## 2023-04-04 NOTE — H&P
History and Physical    Date: 4/4/2023    PCP: Sathish Pandya M.D.      CC: Pancreatic CA with Liver Mets    HPI: This is a 87 y.o. female who is presenting CT biopsy of Liver Mass    Past Medical History:   Diagnosis Date    Arthritis     all over    Bowel habit changes     constipation    Cancer (HCC)     DCIS    Dry cough     Gynecological disorder 1980    Hysterectomy for heavy bleeding    Heart burn     High cholesterol     Hypertension     Pain     arthritis, right shoulder bursitis    Urinary incontinence     wears a pad all the time       Past Surgical History:   Procedure Laterality Date    LUMPECTOMY Right 11/13/2019    Procedure: LUMPECTOMY, BREAST- WIRE LOCALIZED;  Surgeon: Oleg Nguyen M.D.;  Location: SURGERY SAME DAY [facility];  Service: General    OTHER Left 2009    breast biopsy    GYN SURGERY  1970's    hysterectomy    OTHER  1940's    tonsillectomy    OTHER ORTHOPEDIC SURGERY Right     total knee arthroplasty    OTHER ORTHOPEDIC SURGERY Left     partial knee arthroplasty    OTHER ORTHOPEDIC SURGERY Right     bunionectomy       Current Facility-Administered Medications   Medication Dose Route Frequency Provider Last Rate Last Admin    LIDOCAINE HCL (PF) 1 % INJ SOLN                 Social History     Socioeconomic History    Marital status:      Spouse name: Not on file    Number of children: Not on file    Years of education: Not on file    Highest education level: Not on file   Occupational History    Not on file   Tobacco Use    Smoking status: Never    Smokeless tobacco: Never   Vaping Use    Vaping Use: Never used   Substance and Sexual Activity    Alcohol use: Not Currently     Comment: 3 times a year    Drug use: Never    Sexual activity: Not on file   Other Topics Concern    Not on file   Social History Narrative    Not on file     Social Determinants of Health     Financial Resource Strain: Not on file   Food Insecurity: Not on file   Transportation Needs: Not on file   Physical  Activity: Not on file   Stress: Not on file   Social Connections: Not on file   Intimate Partner Violence: Not on file   Housing Stability: Not on file       Family History   Problem Relation Age of Onset    Cancer Sister         breast    Cancer Paternal Aunt         breast    Cancer Paternal Aunt         breast    Cancer Paternal Aunt         breast    Cancer Paternal Aunt         breast    Cancer Paternal Aunt         breast       Allergies:  Fluorometholone, Fosamax, Morphine, Other drug, Tape, and Codeine    Review of Systems:  Negative except for liver mets and History of breast CA    Physical Exam    Vital Signs  Blood Pressure : 131/66   Temperature: 36.8 °C (98.3 °F)   Pulse: 96   Respiration: 14   Pulse Oximetry: 95 %        Labs:          Recent Labs     04/03/23  1516   INR 1.20*     Recent Labs     04/03/23  1516   INR 1.20*       Radiology:  No orders to display             Assessment and Plan:This is a 87 y.o. presents for CT biopsy of Liver Mass

## 2023-04-04 NOTE — DISCHARGE INSTRUCTIONS
HOME CARE INSTRUCTIONS    ACTIVITY: Rest and take it easy for the first 24 hours.  A responsible adult is recommended to remain with you during that time.  It is normal to feel sleepy.  We encourage you to not do anything that requires balance, judgment or coordination.    FOR 24 HOURS DO NOT:  Drive, operate machinery or run household appliances.  Drink beer or alcoholic beverages.  Make important decisions or sign legal documents.    SPECIAL INSTRUCTIONS: Liver Biopsy, Care After  These instructions give you information on caring for yourself after your procedure. Your doctor may also give you more specific instructions. Call your doctor if you have any problems or questions after your procedure.  What can I expect after the procedure?  After the procedure, it is common to have:  Pain and soreness where the biopsy was done.  Bruising around the area where the biopsy was done.  Sleepiness and be tired for a few days.  Follow these instructions at home:  Medicines  Take over-the-counter and prescription medicines only as told by your doctor.  If you were prescribed an antibiotic medicine, take it as told by your doctor. Do not stop taking the antibiotic even if you start to feel better.  Do not take medicines such as aspirin and ibuprofen. These medicines can thin your blood. Do not take these medicines unless your doctor tells you to take them.  If you are taking prescription pain medicine, take actions to prevent or treat constipation. Your doctor may recommend that you:  Drink enough fluid to keep your pee (urine) clear or pale yellow.  Take over-the-counter or prescription medicines.  Eat foods that are high in fiber, such as fresh fruits and vegetables, whole grains, and beans.  Limit foods that are high in fat and processed sugars, such as fried and sweet foods.  Caring for your cut  Follow instructions from your doctor about how to take care of your cuts from surgery (incisions). Make sure you:  Wash your  hands with soap and water before you change your bandage (dressing). If you cannot use soap and water, use hand .  Change your bandage as told by your doctor.  Leave stitches (sutures), skin glue, or skin tape (adhesive) strips in place. They may need to stay in place for 2 weeks or longer. If tape strips get loose and curl up, you may trim the loose edges. Do not remove tape strips completely unless your doctor says it is okay.  Check your cuts every day for signs of infection. Check for:  Redness, swelling, or more pain.  Fluid or blood.  Pus or a bad smell.  Warmth.  Do not take baths, swim, or use a hot tub until your doctor says it is okay to do so.  Activity  Rest at home for 1-2 days or as told by your doctor.  Avoid sitting for a long time without moving. Get up to take short walks every 1-2 hours.  Return to your normal activities as told by your doctor. Ask what activities are safe for you.  Do not do these things in the first 24 hours:  Drive.  Use machinery.  Take a bath or shower.  Do not lift more than 10 pounds (4.5 kg) or play contact sports for the first 2 weeks.  General instructions  Do not drink alcohol in the first week after the procedure.  Have someone stay with you for at least 24 hours after the procedure.  Get your test results. Ask your doctor or the department that is doing the test:  When will my results be ready?  How will I get my results?  What are my treatment options?  What other tests do I need?  What are my next steps?  Keep all follow-up visits as told by your doctor. This is important.  Contact a doctor if:  A cut bleeds and leaves more than just a small spot of blood.  A cut is red, puffs up (swells), or hurts more than before.  Fluid or something else comes from a cut.  A cut smells bad.  You have a fever or chills.  Get help right away if:  You have swelling, bloating, or pain in your belly (abdomen).  You get dizzy or faint.  You have a rash.  You feel sick to your  stomach (nauseous) or throw up (vomit).  You have trouble breathing, feel short of breath, or feel faint.  Your chest hurts.  You have problems talking or seeing.  You have trouble with your balance or moving your arms or legs.  Summary  After the procedure, it is common to have pain, soreness, bruising, and tiredness.  Your doctor will tell you how to take care of yourself at home. Change your bandage, take your medicines, and limit your activities as told by your doctor.  Call your doctor if you have symptoms of infection. Get help right away if your belly swells, your cut bleeds a lot, or you have trouble talking or breathing.  This information is not intended to replace advice given to you by your health care provider. Make sure you discuss any questions you have with your health care provider.  Document Released: 09/26/2009 Document Revised: 12/28/2018 Document Reviewed: 12/28/2018  [employer] Patient Education © 2020 [employer]      DIET: To avoid nausea, slowly advance diet as tolerated, avoiding spicy or greasy foods for the first day.  Add more substantial food to your diet according to your physician's instructions.  Babies can be fed formula or breast milk as soon as they are hungry.  INCREASE FLUIDS AND FIBER TO AVOID CONSTIPATION.    SURGICAL DRESSING/BATHING: may remove dressing in 24 hours and then shower    MEDICATIONS: Resume taking daily medication.  Take prescribed pain medication with food.  If no medication is prescribed, you may take non-aspirin pain medication if needed.  PAIN MEDICATION CAN BE VERY CONSTIPATING.  Take a stool softener or laxative such as senokot, pericolace, or milk of magnesia if needed.    A follow-up appointment should be arranged with your doctor; call to schedule.    You should CALL YOUR PHYSICIAN if you develop:  Fever greater than 101 degrees F.  Pain not relieved by medication, or persistent nausea or vomiting.  Excessive bleeding (blood soaking through dressing) or  unexpected drainage from the wound.  Extreme redness or swelling around the incision site, drainage of pus or foul smelling drainage.  Inability to urinate or empty your bladder within 8 hours.  Problems with breathing or chest pain.    You should call 911 if you develop problems with breathing or chest pain.  If you are unable to contact your doctor or surgical center, you should go to the nearest emergency room or urgent care center.  Physician's telephone #: [phone]    MILD FLU-LIKE SYMPTOMS ARE NORMAL.  YOU MAY EXPERIENCE GENERALIZED MUSCLE ACHES, THROAT IRRITATION, HEADACHE AND/OR SOME NAUSEA.    If any questions arise, call your doctor.  If your doctor is not available, please feel free to call the Surgical Center at [phone].  The Center is open Monday through Friday from 7AM to 7PM.      A registered nurse may call you a few days after your surgery to see how you are doing after your procedure.    You may also receive a survey in the mail within the next two weeks and we ask that you take a few moments to complete the survey and return it to us.  Our goal is to provide you with very good care and we value your comments.     Depression / Suicide Risk    As you are discharged from this [facility] facility, it is important to learn how to keep safe from harming yourself.    Recognize the warning signs:  Abrupt changes in personality, positive or negative- including increase in energy   Giving away possessions  Change in eating patterns- significant weight changes-  positive or negative  Change in sleeping patterns- unable to sleep or sleeping all the time   Unwillingness or inability to communicate  Depression  Unusual sadness, discouragement and loneliness  Talk of wanting to die  Neglect of personal appearance   Rebelliousness- reckless behavior  Withdrawal from people/activities they love  Confusion- inability to concentrate     If you or a loved one observes any of these behaviors or has  concerns about self-harm, here's what you can do:  Talk about it- your feelings and reasons for harming yourself  Remove any means that you might use to hurt yourself (examples: pills, rope, extension cords, firearm)  Get professional help from the community (Mental Health, Substance Abuse, psychological counseling)  Do not be alone:Call your Safe Contact- someone whom you trust who will be there for you.  Call your local CRISIS HOTLINE 959-9886 or 817-865-4268  Call your local Children's Mobile Crisis Response Team [place] (195) 294-9408 or www.[employer]  Call the toll free National Suicide Prevention Hotlines   National Suicide Prevention Lifeline 207-690-ICSC (1022)  National Hope Line Network 800-SUICIDE (329-0802)    I acknowledge receipt and understanding of these Home Care instructions.

## 2023-04-04 NOTE — PROGRESS NOTES
IR Nursing Note    Liver Biopsy by Dr. Sanchez assisted by SAFIA Martin of Abdomen access site.  Procedure site was marked by MD and verified using imaging guidance.  Patient was placed in a supine position. Vitals were taken every 5 minutes and remained stable during procedure (see doc flow sheet for results).  CO2 waveform capnography was monitored and remained stable throughout procedure.  A gauze and medipore dressing was placed over surgical site.     6 cores from liver collected by Dr. Sanchez. Pathology in room for specimen collection.    Report given to Cindy GU; patient transported to PACU via IR RN monitored then transferred care to report RN.

## 2023-04-04 NOTE — OR SURGEON
Immediate Post- Operative Note        Findings: Pancreatic Adenocarcinoma with Liver Mets      Procedure(s): CT Biopsy of Hepatic Met      Estimated Blood Loss: Less than 1 ml        Complications: None            4/4/2023     10:14 AM     Tay Sanchez M.D.

## 2023-04-04 NOTE — OR NURSING
1021: Pt received via gurney with BOAZ SANTOS. Sheath site CDI. Orders reviewed and initiated.     1100: Xray at bedside.     1240: 2 hours of flat bedrest complete. Report called to BOAZ Hernandez. Sheath site CDI. No patient distress. Alert and oriented x4. Pt transported to phase II on room air with no personal belongings. Family updated.

## 2023-04-04 NOTE — OR NURSING
1315 - pt verbalizes readiness for discharge. Instructions reviewed with pt by Erica GU.    1325 - pt's family at bedside. Pt taken to car via wheelchair by RN. No further needs.

## 2023-04-11 NOTE — TELEPHONE ENCOUNTER
Patient called asking if she should still be taking her Alendronate? It is no longer in her med list, please advise.

## 2023-04-12 PROBLEM — C25.2 MALIGNANT NEOPLASM OF TAIL OF PANCREAS (HCC): Status: ACTIVE | Noted: 2023-01-01

## 2023-04-13 NOTE — PROGRESS NOTES
Consult:  Hematology/Oncology      Referring Physician: PCP  Primary Care:  Sathish Pandya M.D.    Diagnosis: Pancreatic adenocarcinoma    Chief Complaint:  Evaluation for systemic therapy    History of Presenting Illness:  Briseida Olivares is a 87 y.o.  woman who presents to the clinic for evaluation for systemic therapy for a new diagnosis of metastatic pancreatic adenocarcinoma arising from the tail of the pancreas. She had been having abdominal pain and weight loss (20 lbs in the past few months) and was sent for a CT scan which revealed metastatic disease. She had a liver lesion biopsied which confirmed poorly-differentiated adenocarcinoma of pancreatic origin. She was subsequently referred to me for evaluation.     Interval History:  Patient is here for consultation. She states that she feels okay, but does have abdominal pain which only somewhat improves with Tylenol. She hasn't been eating much but she hasn't been feeling so fatigued, though she admits that she doesn't do much at home. She was very happy to have her family visit her for Easter though. Her daughter is with her today. She does have some occasional shortness of breath but otherwise is doing okay.     Past Medical History:   Diagnosis Date    Arthritis     all over    Bowel habit changes     constipation    Cancer (HCC)     DCIS    Dry cough     Gynecological disorder 1980    Hysterectomy for heavy bleeding    Heart burn     High cholesterol     Hypertension     Pain     arthritis, right shoulder bursitis    Urinary incontinence     wears a pad all the time       Past Surgical History:   Procedure Laterality Date    LUMPECTOMY Right 11/13/2019    Procedure: LUMPECTOMY, BREAST- WIRE LOCALIZED;  Surgeon: Oleg Nguyen M.D.;  Location: SURGERY SAME DAY [facility];  Service: General    OTHER Left 2009    breast biopsy    GYN SURGERY  1970's    hysterectomy    OTHER  1940's    tonsillectomy    OTHER ORTHOPEDIC SURGERY Right     total knee  arthroplasty    OTHER ORTHOPEDIC SURGERY Left     partial knee arthroplasty    OTHER ORTHOPEDIC SURGERY Right     bunionectomy       Social History     Socioeconomic History    Marital status:      Spouse name: Not on file    Number of children: Not on file    Years of education: Not on file    Highest education level: Not on file   Occupational History    Not on file   Tobacco Use    Smoking status: Never    Smokeless tobacco: Never   Vaping Use    Vaping Use: Never used   Substance and Sexual Activity    Alcohol use: Not Currently     Comment: 3 times a year    Drug use: Never    Sexual activity: Not on file   Other Topics Concern    Not on file   Social History Narrative    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 Lives with her daughter currently. She has four children.      Social Determinants of Health     Financial Resource Strain: Not on file   Food Insecurity: Not on file   Transportation Needs: Not on file   Physical Activity: Not on file   Stress: Not on file   Social Connections: Not on file   Intimate Partner Violence: Not on file   Housing Stability: Not on file       Family History   Problem Relation Age of Onset    Cancer Sister         breast    Cancer Paternal Aunt         breast    Cancer Paternal Aunt         breast    Cancer Paternal Aunt         breast    Cancer Paternal Aunt         breast    Cancer Paternal Aunt         breast       OB History   No obstetric history on file.       Allergies as of 04/12/2023 - Reviewed 04/12/2023   Allergen Reaction Noted    Fluorometholone  10/19/2015    Fosamax  10/28/2014    Morphine Vomiting 08/27/2020    Other drug  11/07/2019    Tape Rash 01/24/2020    Codeine Vomiting 11/07/2019         Current Outpatient Medications:     rosuvastatin (CRESTOR) 20 MG Tab, Take 1 Tablet by mouth every evening., Disp: 100 Tablet, Rfl: 3    losartan (COZAAR) 50 MG Tab, Take 1 Tablet by mouth every day., Disp: 100  Tablet, Rfl: 3    Multiple Vitamins-Minerals (PRESERVISION AREDS 2) Cap, Take  by mouth every bedtime., Disp: , Rfl:     cyclosporin (RESTASIS) 0.05 % ophthalmic emulsion, Place 1 Drop in both eyes 2 times a day., Disp: , Rfl:     NON SPECIFIED, Systane eye drops, daily, Disp: , Rfl:     Coenzyme Q10-Vitamin E (COQ10 ST-100 PO), Take  by mouth every day. (Patient not taking: Reported on 4/12/2023), Disp: , Rfl:     Review of Systems:  Review of Systems   Constitutional:  Positive for malaise/fatigue and weight loss. Negative for chills, diaphoresis and fever.   HENT:  Negative for hearing loss, nosebleeds, sinus pain and sore throat.    Eyes:  Negative for blurred vision and double vision.   Respiratory:  Positive for shortness of breath (Occasional). Negative for cough, sputum production and wheezing.    Cardiovascular:  Negative for chest pain, palpitations, orthopnea and leg swelling.   Gastrointestinal:  Positive for abdominal pain. Negative for blood in stool, constipation, diarrhea, heartburn, melena, nausea and vomiting.   Genitourinary:  Negative for dysuria, frequency, hematuria and urgency.   Musculoskeletal:  Positive for back pain. Negative for joint pain and myalgias.   Skin:  Negative for rash.   Neurological:  Negative for dizziness, tingling, weakness and headaches.   Endo/Heme/Allergies:  Does not bruise/bleed easily.   Psychiatric/Behavioral:  The patient is not nervous/anxious and does not have insomnia.         Physical Exam:  Vitals:    04/12/23 1605   BP: 120/50   BP Location: Right arm   Patient Position: Sitting   BP Cuff Size: Adult   Pulse: (!) 105   Temp: 37.2 °C (99 °F)   TempSrc: Temporal   SpO2: 93%   Weight: 54 kg (119 lb)   Height: 1.524 m (5')       DESC; KARNOFSKY SCALE WITH ECOG EQUIVALENT: 90, Able to carry on normal activity; minor signs or symptoms of disease (ECOG equivalent 0)    DISTRESS LEVEL: no apparent distress    Physical Exam  Vitals and nursing note reviewed.    Constitutional:       General: She is awake. She is not in acute distress.     Appearance: Normal appearance. She is normal weight. She is not ill-appearing, toxic-appearing or diaphoretic.   HENT:      Head: Normocephalic and atraumatic.      Nose: Nose normal. No congestion.      Mouth/Throat:      Pharynx: Oropharynx is clear. No oropharyngeal exudate or posterior oropharyngeal erythema.   Eyes:      General: No scleral icterus.     Extraocular Movements: Extraocular movements intact.      Conjunctiva/sclera: Conjunctivae normal.      Pupils: Pupils are equal, round, and reactive to light.   Cardiovascular:      Rate and Rhythm: Normal rate and regular rhythm.      Pulses: Normal pulses.      Heart sounds: Normal heart sounds. No murmur heard.    No friction rub. No gallop.   Pulmonary:      Effort: Pulmonary effort is normal.      Breath sounds: Normal breath sounds. No decreased air movement. No wheezing, rhonchi or rales.   Abdominal:      General: Bowel sounds are normal. There is no distension.      Tenderness: There is abdominal tenderness in the right upper quadrant, epigastric area and left upper quadrant.   Musculoskeletal:         General: No deformity. Normal range of motion.      Cervical back: Normal range of motion and neck supple. No tenderness.      Right lower leg: No edema.      Left lower leg: No edema.   Lymphadenopathy:      Cervical: No cervical adenopathy.      Upper Body:      Right upper body: No axillary adenopathy.      Left upper body: No axillary adenopathy.      Lower Body: No right inguinal adenopathy. No left inguinal adenopathy.   Skin:     General: Skin is warm and dry.      Coloration: Skin is not jaundiced.      Findings: No erythema or rash.   Neurological:      General: No focal deficit present.      Mental Status: She is alert and oriented to person, place, and time.      Sensory: Sensation is intact.      Motor: Motor function is intact. No weakness.      Gait: Gait is  intact.   Psychiatric:         Attention and Perception: Attention normal.         Mood and Affect: Mood normal.         Behavior: Behavior normal. Behavior is cooperative.         Thought Content: Thought content normal.         Judgment: Judgment normal.        Depression Screening    Little interest or pleasure in doing things?      Feeling down, depressed , or hopeless?     Trouble falling or staying asleep, or sleeping too much?      Feeling tired or having little energy?      Poor appetite or overeating?      Feeling bad about yourself - or that you are a failure or have let yourself or your family down?     Trouble concentrating on things, such as reading the newspaper or watching television?     Moving or speaking so slowly that other people could have noticed.  Or the opposite - being so fidgety or restless that you have been moving around a lot more than usual?      Thoughts that you would be better off dead, or of hurting yourself?      Patient Health Questionnaire Score:         If depressive symptoms identified deferred to follow up visit unless specifically addressed in assesment and plan.    Interpretation of PHQ-9 Total Score   Score Severity   1-4 No Depression   5-9 Mild Depression   10-14 Moderate Depression   15-19 Moderately Severe Depression   20-27 Severe Depression    Labs:  No visits with results within 7 Day(s) from this visit.   Latest known visit with results is:   Admission on 04/04/2023, Discharged on 04/04/2023   Component Date Value Ref Range Status    Pathology Request 04/04/2023 Sent to Histo   Final       Imaging:   All listed images below have been independently reviewed by me. I agree with the findings as summarized below:    CT-ABDOMEN-PELVIS WITH    Result Date: 3/28/2023  3/27/2023 4:31 PM HISTORY/REASON FOR EXAM:  Epigastric abdominal pain and weight loss history of right breast cancer. TECHNIQUE/EXAM DESCRIPTION:   CT scan of the abdomen and pelvis with contrast.  Contrast-enhanced helical scanning was obtained from the diaphragmatic domes through the pubic symphysis following the bolus administration of nonionic contrast without complication. 100 mL of Omnipaque 350 nonionic contrast was administered without complication. Low dose optimization technique was utilized for this CT exam including automated exposure control and adjustment of the mA and/or kV according to patient size. COMPARISON: No prior studies available. FINDINGS: Lower Chest: Unremarkable. Liver: Necrotic solid masses are identified in the liver. Largest is located in the left lobe of the liver measuring 4.1 x 4.0 cm in maximum axial dimensions. Second largest mass is in the medial right lobe posterior to the gallbladder and an hepatis measuring 2.5 x 2.0 cm. Several smaller masses are noted in the right and left lobes. Spleen: Mass versus abnormal decreased enhancement laterally in the spleen measures 4.0 x 2.4 cm.  There are other minimal subtle linear areas of decreased enhancement. Pancreas: Necrotic mass in the tail of the pancreas measures 3.3 x 3.2 cm.. Gallbladder: No calcified stones. Biliary: Nondilated. Adrenal glands: Normal. Kidneys: Unremarkable without hydronephrosis. Bowel: No obstruction or acute inflammation. There is diverticulosis. Lymph nodes: No adenopathy. Vasculature: Unremarkable. Peritoneum: Unremarkable without ascites. Musculoskeletal: No acute or destructive process. Pelvis: No adenopathy or free fluid.     1.  Necrotic mass in the distal pancreatic tail measuring 3.3 x 3.2 cm in size is identified consistent with pancreatic carcinoma. 2.  Necrotic appearing masses within the liver are identified consistent with liver metastases. 3.  Lesion located laterally in the spleen is identified. This could represent a neoplastic lesion such as metastasis. Splenic infarct is also possible.    CT-NEEDLE BX-LIVER    Result Date: 4/4/2023 4/4/2023 9:45 AM HISTORY/REASON FOR EXAM:  Liver  "metastases, recently diagnosed pancreatic cancer. TECHNIQUE/EXAM DESCRIPTION: CT-guided core biopsy of hepatic mass. Following informed consent the patient was prepped and draped in the usual fashion.  8 cc of 1% lidocaine were utilized for local and subcutaneous anesthesia. Utilizing CT guidance a 17-gauge introducer needle was advanced through the upper anterior abdominal wall and positioned adjacent to the left hepatic mass. 4 core biopsies were obtained with an 18-gauge core biopsy needle and placed in formalin and sent to lab for analysis. 2 additional specimens were placed on a glass slide and given to the pathologist for analysis. The pathologist stated that \"adenocarcinoma\" cells were observed.  Patient tolerated procedure well and was sent to the floor for observation. The biopsy/drainage was done utilizing low dose optimization CT techniques including auto modulation for  imaging and low mA CT/fluoroscopy mode for the procedure. SEDATION: Versed and fentanyl were utilized for IV conscious sedation. The patient was continuously monitored by the sedation nurse during the procedure. Sedation time was 30 minutes. COMPARISON:  CT scan abdomen/pelvis 3/27/2023. FINDINGS:  CT images demonstrate the biopsy needle well positioned in the left hepatic mass.     Successful CT-guided core biopsy of the left hepatic mass.    DX-CHEST-PORTABLE (1 VIEW)    Result Date: 4/4/2023 4/4/2023 11:19 AM HISTORY/REASON FOR EXAM:  Chest Pain TECHNIQUE/EXAM DESCRIPTION AND NUMBER OF VIEWS: Single portable view of the chest. COMPARISON: None FINDINGS: HEART: Not enlarged. There is atherosclerotic calcification in the aortic arch. LUNGS: No areas of air space disease are demonstrated. PLEURA: No effusion or pneumothorax.     1.  No acute cardiac or pulmonary abnormalities are identified.       Pathology:  FINAL DIAGNOSIS:     A. 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  Microscopic examination reveals involvement by an           extensively necrotic poorly differentiated adenocarcinoma.           Immunohistochemistry shows the tumor cells to be positive for           CK7 and CDX2.  GATA3 is negative.  While not entirely specific,           this immunophenotype is compatible with pancreatic origin.  If           ancillary studies are requested, block A3 has the most viable           tumor cellularity for evaluation.                                         Diagnosis performed by:                                       OSVALDO MONTOYA DO     Assessment & Plan:  1. Malignant neoplasm of tail of pancreas (HCC)  Referral to Genetics    Referral to Hospice          This is an 87 year old  woman with metastatic pancreatic adenocarcinoma (cTxNxM1 stage IV disease). She presents for evaluation for systemic therapy.     Current Diagnosis and Staging: Pancreatic adenocarcinoma, cTxNxM1 stage IV disease    Treatment Plan: Hospice care    Treatment Citation: [facility]N    Plan of Care:    Primary Therapy: NA  Supportive Therapy: Hospice care  Toxicity: NA  Labs: Referral for genetic testing given strong family history  Imaging: NA  Treatment Planning: Discussed options of care for patient as well as goals of care. Patient and her daughter are in agreement that the patient would not want to pursue chemotherapy and would prefer symptom management through hospice care. A referral has been placed. The patient would like genetic testing though, given her strong family history of malignancy (as well as personal history) and desire to help her family. This referral has been made.   Consultations: Hospice care  Code Status: Discussed today, patient to opt for DNR/DNI  Miscellaneous: Referral to medical genetics placed  Return for  Follow Up: PRN    Any questions and concerns raised by the patient were answered to the best of my ability. Thank you for allowing me to participate in the care for this patient. Please feel free to contact me for any questions or concerns.     Total time spent on chart review, clinic encounter, and documentation: 63 minutes.

## 2023-04-18 NOTE — PROGRESS NOTES
Pt discussed with Mary Mane RN. Patient admitting to community hospice today with primary diagnosis of pancreatic cancer. Pain is managed with tylenol 1,000mg TID, severe nausea with opioids. Per family request, ibuprofen ordered as adjunct. Comfort medications ordered.

## 2023-05-12 NOTE — TELEPHONE ENCOUNTER
Pt was warmer transferred by JOSHUA Martinez rep./ she confirmed that pt is fully verified.     Briseida cancelled her [facility] appt, because she is under hospice care now.

## 2023-05-31 NOTE — PROGRESS NOTES
Briseida Olivares is a 87 y.o. female here for a non-provider visit for: Lab Draws  on 5/31/2023 at 9:46 AM    Procedure Performed: Venipuncture     Anatomical site: Right Antecubital Area (AC)    Equipment used: 25 butterfly    Labs drawn: Verdiem (two lavender EDTA tubes)    Ordering Provider: [employer]    Saqib By: Cindy Guadarrama, Med Ass't

## 2023-06-07 NOTE — PROGRESS NOTES
Patient is having nausea, lack of appetite and epigastric fullness. Vomiting with trial of low-dose morphine. Will trial switch to oxycodone liquid and start 10 days of dexamethasone.

## 2024-06-06 NOTE — ASSESSMENT & PLAN NOTE
At this time her exam was negative with negative straight leg raise and 1+ DTRs bilaterally.  She is able to ambulate quite comfortably and stably.  We discussed various options and I showed her some piriformis stretching.  I will also refer her to physical therapy.   Sleep study results relayed to patient via [employer].

## 2024-07-17 NOTE — OR NURSING
Continue safety strategies    Start Melatonin over the counter if behavior becomes frequent or disruptive.  Start at 3mg quick release  Increase by 3 mg every week until behaviors subside.  Usual effective dose is 6-18 mg       NPO, allergy status , consent for surgical procedure verified and signed by the patient . Dr. Johnson and daughter at the bedside. IV started at left FA g 20 and started on Nacl 1 liter regulated as ordered. Infusing well. Waiting for Dr. Sanchez.patient on low position bed with call bell within reach.

## (undated) DEVICE — DRAPE IOBAN II INCISE 23X17 - (10EA/BX 4BX/CA)

## (undated) DEVICE — GLOVE BIOGEL SZ 7 SURGICAL PF LTX - (50PR/BX 4BX/CA)

## (undated) DEVICE — CLOSURE SKIN STRIP 1/2 X 4 IN - (STERI STRIP) (50/BX 4BX/CA)

## (undated) DEVICE — SUCTION INSTRUMENT YANKAUER BULBOUS TIP W/O VENT (50EA/CA)

## (undated) DEVICE — SLEEVE, VASO, THIGH, MED

## (undated) DEVICE — CANISTER SUCTION 3000ML MECHANICAL FILTER AUTO SHUTOFF MEDI-VAC NONSTERILE LF DISP  (40EA/CA)

## (undated) DEVICE — CANISTER SUCTION RIGID RED 1500CC (40EA/CA)

## (undated) DEVICE — CATHETER IV 20 GA X 1-1/4 ---SURG.& SDS ONLY--- (50EA/BX)

## (undated) DEVICE — PROTECTOR ULNA NERVE - (36PR/CA)

## (undated) DEVICE — DRESSING TRANSPARENT FILM TEGADERM 2.375 X 2.75"  (100EA/BX)"

## (undated) DEVICE — KIT ANESTHESIA W/CIRCUIT & 3/LT BAG W/FILTER (20EA/CA)

## (undated) DEVICE — GOWN WARMING STANDARD FLEX - (30/CA)

## (undated) DEVICE — PACK MINOR BASIN - (2EA/CA)

## (undated) DEVICE — SODIUM CHL IRRIGATION 0.9% 1000ML (12EA/CA)

## (undated) DEVICE — DRESSING TRANSPARENT FILM TEGADERM 4 X 4.75" (50EA/BX)"

## (undated) DEVICE — SET LEADWIRE 5 LEAD BEDSIDE DISPOSABLE ECG (1SET OF 5/EA)

## (undated) DEVICE — NEPTUNE 4 PORT MANIFOLD - (20/PK)

## (undated) DEVICE — MASK ANESTHESIA ADULT  - (100/CA)

## (undated) DEVICE — GLOVE BIOGEL SZ 8 SURGICAL PF LTX - (50PR/BX 4BX/CA)

## (undated) DEVICE — DRESSING ABDOMINAL PAD STERILE 8 X 10" (360EA/CA)"

## (undated) DEVICE — SPONGE GAUZESTER. 2X2 4-PL - (2/PK 50PK/BX 30BX/CS)

## (undated) DEVICE — SENSOR SPO2 NEO LNCS ADHESIVE (20/BX) SEE USER NOTES

## (undated) DEVICE — SUTURE GENERAL

## (undated) DEVICE — STERI STRIP COMPOUND BENZOIN - TINCTURE 0.6ML WITH APPLICATOR (40EA/BX)

## (undated) DEVICE — CHLORAPREP 26 ML APPLICATOR - ORANGE TINT(25/CA)

## (undated) DEVICE — TUBE CONNECTING SUCTION - CLEAR PLASTIC STERILE 72 IN (50EA/CA)

## (undated) DEVICE — ELECTRODE DUAL RETURN W/ CORD - (50/PK)

## (undated) DEVICE — TUBING CLEARLINK DUO-VENT - C-FLO (48EA/CA)

## (undated) DEVICE — SUTURE 3-0 VICRYL PLUS SH - 8X 18 INCH (12/BX)

## (undated) DEVICE — BANDAGE ELASTIC STERILE MATRIX 6 X 10 (20EA/CA)

## (undated) DEVICE — LACTATED RINGERS INJ 1000 ML - (14EA/CA 60CA/PF)

## (undated) DEVICE — GLOVE BIOGEL INDICATOR SZ 7.5 SURGICAL PF LTX - (50PR/BX 4BX/CA)

## (undated) DEVICE — HEAD HOLDER JUNIOR/ADULT

## (undated) DEVICE — WATER IRRIGATION STERILE 1000ML (12EA/CA)

## (undated) DEVICE — KIT  I.V. START (100EA/CA)

## (undated) DEVICE — SUTURE 4-0 MONOCRYL PLUS PS-2 - 27 INCH (36/BX)

## (undated) DEVICE — ELECTRODE 850 FOAM ADHESIVE - HYDROGEL RADIOTRNSPRNT (50/PK)

## (undated) DEVICE — BANDAGE ELASTIC 6 IN X 5 YDS - LATEX FREE (10/BX)